# Patient Record
Sex: FEMALE | Race: WHITE | Employment: UNEMPLOYED | URBAN - METROPOLITAN AREA
[De-identification: names, ages, dates, MRNs, and addresses within clinical notes are randomized per-mention and may not be internally consistent; named-entity substitution may affect disease eponyms.]

---

## 2017-01-26 ENCOUNTER — OFFICE VISIT (OUTPATIENT)
Dept: INTERNAL MEDICINE CLINIC | Facility: CLINIC | Age: 32
End: 2017-01-26

## 2017-01-26 VITALS
BODY MASS INDEX: 28 KG/M2 | DIASTOLIC BLOOD PRESSURE: 60 MMHG | RESPIRATION RATE: 16 BRPM | SYSTOLIC BLOOD PRESSURE: 120 MMHG | HEART RATE: 100 BPM | WEIGHT: 169 LBS | TEMPERATURE: 99 F

## 2017-01-26 DIAGNOSIS — R53.83 FATIGUE, UNSPECIFIED TYPE: Primary | ICD-10-CM

## 2017-01-26 DIAGNOSIS — R20.2 BILATERAL NUMBNESS AND TINGLING OF ARMS AND LEGS: ICD-10-CM

## 2017-01-26 DIAGNOSIS — R20.0 BILATERAL NUMBNESS AND TINGLING OF ARMS AND LEGS: ICD-10-CM

## 2017-01-26 PROCEDURE — 99214 OFFICE O/P EST MOD 30 MIN: CPT | Performed by: PHYSICIAN ASSISTANT

## 2017-01-26 NOTE — PROGRESS NOTES
Paramjit Willett is a 27year old female  Patient presents with:  Numbness: arms and legs. worse in arms.        HPI:   Pt here today with concerns regarding numbness/tingling in legs/arms  - states that it has been going on for a few months now; not vadim 16  Wt 169 lb  LMP 12/15/2016  GENERAL: well developed, well nourished,in no apparent distress  SKIN: no rashes,no suspicious lesions  HEENT: atraumatic, normocephalic  LUNGS: clear to auscultation   CARDIO: RRR without murmur,   GI: good BS's,no masses, o

## 2017-01-27 ENCOUNTER — LAB ENCOUNTER (OUTPATIENT)
Dept: LAB | Age: 32
End: 2017-01-27
Attending: PHYSICIAN ASSISTANT
Payer: COMMERCIAL

## 2017-01-27 DIAGNOSIS — R53.83 FATIGUE: ICD-10-CM

## 2017-01-27 DIAGNOSIS — R20.2 PARESTHESIA: Primary | ICD-10-CM

## 2017-01-27 LAB
25-HYDROXYVITAMIN D (TOTAL): 11.2 NG/ML (ref 30–100)
BASOPHILS # BLD AUTO: 0.05 X10(3) UL (ref 0–0.1)
BASOPHILS NFR BLD AUTO: 0.9 %
EOSINOPHIL # BLD AUTO: 0.07 X10(3) UL (ref 0–0.3)
EOSINOPHIL NFR BLD AUTO: 1.2 %
ERYTHROCYTE [DISTWIDTH] IN BLOOD BY AUTOMATED COUNT: 11.7 % (ref 11.5–16)
FOLATE (FOLIC ACID), SERUM: 13.5 NG/ML (ref 8.7–24)
HAV AB SERPL IA-ACNC: 616 PG/ML (ref 193–986)
HCT VFR BLD AUTO: 40.4 % (ref 34–50)
HGB BLD-MCNC: 13.6 G/DL (ref 12–16)
IMMATURE GRANULOCYTE COUNT: 0.01 X10(3) UL (ref 0–1)
IMMATURE GRANULOCYTE RATIO %: 0.2 %
LYMPHOCYTES # BLD AUTO: 1.39 X10(3) UL (ref 0.9–4)
LYMPHOCYTES NFR BLD AUTO: 23.7 %
MCH RBC QN AUTO: 30.3 PG (ref 27–33.2)
MCHC RBC AUTO-ENTMCNC: 33.7 G/DL (ref 31–37)
MCV RBC AUTO: 90 FL (ref 81–100)
MONOCYTES # BLD AUTO: 0.45 X10(3) UL (ref 0.1–0.6)
MONOCYTES NFR BLD AUTO: 7.7 %
NEUTROPHIL ABS PRELIM: 3.89 X10 (3) UL (ref 1.3–6.7)
NEUTROPHILS # BLD AUTO: 3.89 X10(3) UL (ref 1.3–6.7)
NEUTROPHILS NFR BLD AUTO: 66.3 %
PLATELET # BLD AUTO: 299 10(3)UL (ref 150–450)
RBC # BLD AUTO: 4.49 X10(6)UL (ref 3.8–5.1)
RED CELL DISTRIBUTION WIDTH-SD: 38 FL (ref 35.1–46.3)
TSI SER-ACNC: 2.36 MIU/ML (ref 0.35–5.5)
WBC # BLD AUTO: 5.9 X10(3) UL (ref 4–13)

## 2017-01-27 PROCEDURE — 82306 VITAMIN D 25 HYDROXY: CPT

## 2017-01-27 PROCEDURE — 82607 VITAMIN B-12: CPT

## 2017-01-27 PROCEDURE — 85025 COMPLETE CBC W/AUTO DIFF WBC: CPT

## 2017-01-27 PROCEDURE — 82746 ASSAY OF FOLIC ACID SERUM: CPT

## 2017-01-27 PROCEDURE — 84443 ASSAY THYROID STIM HORMONE: CPT

## 2017-01-30 ENCOUNTER — TELEPHONE (OUTPATIENT)
Dept: INTERNAL MEDICINE CLINIC | Facility: CLINIC | Age: 32
End: 2017-01-30

## 2017-01-30 DIAGNOSIS — E55.9 VITAMIN D DEFICIENCY: Primary | ICD-10-CM

## 2017-01-30 RX ORDER — ERGOCALCIFEROL 1.25 MG/1
50000 CAPSULE ORAL WEEKLY
Qty: 12 CAPSULE | Refills: 0 | Status: SHIPPED | OUTPATIENT
Start: 2017-01-30 | End: 2017-04-18

## 2017-01-30 NOTE — TELEPHONE ENCOUNTER
----- Message from [place], [address] Sung sent at 1/30/2017  9:12 AM CST -----  Labs show low vitamin D; please have pt start on 50,000 IU vitamin D weekly x12 weeks and then recheck vitamin D level  - all other labs are normal. Please have her continue with pl

## 2017-02-07 ENCOUNTER — TELEPHONE (OUTPATIENT)
Dept: NEUROLOGY | Facility: CLINIC | Age: 32
End: 2017-02-07

## 2017-02-20 ENCOUNTER — OFFICE VISIT (OUTPATIENT)
Dept: NEUROLOGY | Facility: CLINIC | Age: 32
End: 2017-02-20

## 2017-02-20 VITALS
BODY MASS INDEX: 27.32 KG/M2 | SYSTOLIC BLOOD PRESSURE: 114 MMHG | DIASTOLIC BLOOD PRESSURE: 76 MMHG | WEIGHT: 170 LBS | HEART RATE: 84 BPM | RESPIRATION RATE: 18 BRPM | HEIGHT: 66 IN

## 2017-02-20 DIAGNOSIS — R20.9 DISTURBANCE OF SKIN SENSATION: Primary | ICD-10-CM

## 2017-02-20 PROCEDURE — 99244 OFF/OP CNSLTJ NEW/EST MOD 40: CPT | Performed by: OTHER

## 2017-02-20 NOTE — PATIENT INSTRUCTIONS
Refill policies:    • Allow 2 business days for refills; controlled substances may take longer.   • Contact your pharmacy at least 5 days prior to running out of medication and have them send an electronic request or submit request through the “request re your physician has recommended that you have a procedure or additional testing performed. [facility] will contact your insurance carrier to obtain pre-certification or prior authorization.     Unfortunately, LISA has seen an increas

## 2017-02-21 ENCOUNTER — TELEPHONE (OUTPATIENT)
Dept: NEUROLOGY | Facility: CLINIC | Age: 32
End: 2017-02-21

## 2017-02-21 NOTE — TELEPHONE ENCOUNTER
Started PA [employer]. Unable to complete  Contacted insur, spoke to rep May. Advised no pre Lamin Maw is required for MRI brain (cpt code 60816) and MRI spine cervical (cpt code 68324).   HMO policy per ID# [id number] termed 00/32/05  POS policy per I

## 2017-11-05 ENCOUNTER — OFFICE VISIT (OUTPATIENT)
Dept: FAMILY MEDICINE CLINIC | Facility: CLINIC | Age: 32
End: 2017-11-05

## 2017-11-05 VITALS
OXYGEN SATURATION: 98 % | HEIGHT: 65 IN | HEART RATE: 85 BPM | DIASTOLIC BLOOD PRESSURE: 72 MMHG | SYSTOLIC BLOOD PRESSURE: 118 MMHG | BODY MASS INDEX: 25.83 KG/M2 | WEIGHT: 155 LBS | TEMPERATURE: 99 F

## 2017-11-05 DIAGNOSIS — L30.9 ECZEMA OF BOTH HANDS: ICD-10-CM

## 2017-11-05 DIAGNOSIS — J02.9 SORE THROAT: Primary | ICD-10-CM

## 2017-11-05 DIAGNOSIS — L30.9 DERMATITIS: ICD-10-CM

## 2017-11-05 DIAGNOSIS — K13.70 LESION OF MOUTH: ICD-10-CM

## 2017-11-05 PROCEDURE — 87880 STREP A ASSAY W/OPTIC: CPT | Performed by: PHYSICIAN ASSISTANT

## 2017-11-05 PROCEDURE — 99213 OFFICE O/P EST LOW 20 MIN: CPT | Performed by: PHYSICIAN ASSISTANT

## 2017-11-05 RX ORDER — FLUOCINONIDE 0.5 MG/G
OINTMENT TOPICAL
Qty: 30 G | Refills: 0 | Status: SHIPPED | OUTPATIENT
Start: 2017-11-05 | End: 2018-04-25

## 2017-11-05 RX ORDER — MUPIROCIN CALCIUM 20 MG/G
CREAM TOPICAL
Qty: 15 G | Refills: 0 | Status: SHIPPED | OUTPATIENT
Start: 2017-11-05 | End: 2018-04-25

## 2017-11-05 RX ORDER — VALACYCLOVIR HYDROCHLORIDE 1 G/1
2 TABLET, FILM COATED ORAL EVERY 12 HOURS SCHEDULED
Qty: 4 TABLET | Refills: 0 | Status: SHIPPED | OUTPATIENT
Start: 2017-11-05 | End: 2017-11-06

## 2017-11-05 NOTE — PROGRESS NOTES
CHIEF COMPLAINT:   Patient presents with:  Sore Throat: x 5 dys   Rash: pt states she started with a small bump near mouth and now has more on left side of  face near mouth and  has some in mouth, also has small red spots on hands x 3 dys       HPI:   Meggan Torres COLONOSCOPY  2/19/2014: LAPAROSCOPIC CHOLECYSTECTOMY      Comment: Procedure: LAPAROSCOPIC CHOLECYSTECTOMY;                 Surgeon: Fabrizio Schwab MD;  Location: [facility]                MAIN OR  No date: OTHER SURGICAL HISTORY      Comment: eye surgery as a ASSAY W/OPTIC   Collection Time: 11/05/17 10:15 AM   Result Value Ref Range   STREP GRP A CUL-SCR neg Negative   Control Line Present with a clear background (yes/no) yes Yes/No   Kit Lot # [id number] Numeric   Kit Expiration Date 11/31/2018 Date         A for course of illness explained  Risks, benefits, and side effects of medication explained and discussed. The patient indicates understanding of these issues and agrees to the plan. The patient is asked to return if sx's persist or worsen.   Patient Instr

## 2017-11-05 NOTE — PATIENT INSTRUCTIONS
Sore on lip- again, this may very well be associated with a mild form of hand, foot, and mouth but since this lesion just popped up now and is on your lip, we will cover with an antiviral medication for a cold sore.   Take valacyclovir- take 2 tablets at th

## 2017-11-09 PROCEDURE — 87591 N.GONORRHOEAE DNA AMP PROB: CPT | Performed by: OBSTETRICS & GYNECOLOGY

## 2017-11-09 PROCEDURE — 88175 CYTOPATH C/V AUTO FLUID REDO: CPT | Performed by: OBSTETRICS & GYNECOLOGY

## 2017-11-09 PROCEDURE — 87491 CHLMYD TRACH DNA AMP PROBE: CPT | Performed by: OBSTETRICS & GYNECOLOGY

## 2017-11-09 PROCEDURE — 87624 HPV HI-RISK TYP POOLED RSLT: CPT | Performed by: OBSTETRICS & GYNECOLOGY

## 2017-11-28 ENCOUNTER — TELEPHONE (OUTPATIENT)
Dept: OBGYN CLINIC | Facility: CLINIC | Age: 32
End: 2017-11-28

## 2017-11-28 NOTE — TELEPHONE ENCOUNTER
Pt has requested records to be sent to Dr Anna Ward; pap,colpo, IUD insertion/removal from any years of treatment plus [address], [place] [address], [place] [address]  Sent to scan November 28,2017

## 2018-01-29 ENCOUNTER — TELEPHONE (OUTPATIENT)
Dept: INTERNAL MEDICINE CLINIC | Facility: CLINIC | Age: 33
End: 2018-01-29

## 2018-01-29 NOTE — TELEPHONE ENCOUNTER
Patient scheduled an appt via [employer] for today at 1:20 with Dr Jeff Up. She said she \"felt tightness in chest after eating & diarrhea \". Is this appt appropriate?

## 2018-01-29 NOTE — TELEPHONE ENCOUNTER
Left 3rd msg for pt to call back. No alternate number. Routed to Dr Kelly Pacheco as Kirill Kale.  Pt has 140pm appt today with Dr Kelly Pacheco for \"chest tightness when eating & diarrhea\"

## 2018-01-29 NOTE — TELEPHONE ENCOUNTER
Pt needs to be triaged. LM for pt to call back as would like to discuss symptoms now rather than wait until her appt.

## 2018-01-29 NOTE — TELEPHONE ENCOUNTER
Left 2nd msg for pt to call back as soon as she is able to discuss her symptoms of \"chest tightness after eating and diarrhea\".

## 2018-01-30 NOTE — TELEPHONE ENCOUNTER
Still no answer on pts only available number. Left detailed msg for pt advising that she missed her appt yesterday so doctor wanted to be sure pt knows that if her symptoms of chest tightness continues she should go to Urgent Care.  Pt asked to call back to

## 2018-01-30 NOTE — TELEPHONE ENCOUNTER
Pt. called and stated her sitter didn't show up yesterday, so she could not come to the appt. Reiterated Dr. Livier Nichols instructions to go to Urgent Care should symptoms recur.

## 2018-01-30 NOTE — TELEPHONE ENCOUNTER
Pt no showed for appointment today. Please let her know that if she continues to have symptoms of chest tightness she should go to the urgent care for evaluation.

## 2018-02-17 ENCOUNTER — HOSPITAL ENCOUNTER (EMERGENCY)
Facility: HOSPITAL | Age: 33
Discharge: HOME | End: 2018-02-17
Attending: EMERGENCY MEDICINE
Payer: COMMERCIAL

## 2018-02-17 ENCOUNTER — APPOINTMENT (OUTPATIENT)
Dept: CT IMAGING | Facility: HOSPITAL | Age: 33
End: 2018-02-17
Attending: EMERGENCY MEDICINE
Payer: COMMERCIAL

## 2018-02-17 ENCOUNTER — APPOINTMENT (OUTPATIENT)
Dept: GENERAL RADIOLOGY | Facility: HOSPITAL | Age: 33
End: 2018-02-17
Attending: EMERGENCY MEDICINE
Payer: COMMERCIAL

## 2018-02-17 VITALS
HEART RATE: 76 BPM | TEMPERATURE: 98 F | RESPIRATION RATE: 15 BRPM | BODY MASS INDEX: 25.71 KG/M2 | SYSTOLIC BLOOD PRESSURE: 120 MMHG | DIASTOLIC BLOOD PRESSURE: 84 MMHG | OXYGEN SATURATION: 100 % | HEIGHT: 66 IN | WEIGHT: 160 LBS

## 2018-02-17 DIAGNOSIS — R00.2 PALPITATIONS: ICD-10-CM

## 2018-02-17 DIAGNOSIS — R07.9 ACUTE CHEST PAIN: Primary | ICD-10-CM

## 2018-02-17 LAB
ALBUMIN SERPL-MCNC: 3.4 G/DL (ref 3.5–4.8)
ALP LIVER SERPL-CCNC: 75 U/L (ref 37–98)
ALT SERPL-CCNC: 14 U/L (ref 14–54)
AST SERPL-CCNC: 15 U/L (ref 15–41)
BASOPHILS # BLD AUTO: 0.04 X10(3) UL (ref 0–0.1)
BASOPHILS NFR BLD AUTO: 0.7 %
BILIRUB SERPL-MCNC: 0.3 MG/DL (ref 0.1–2)
BUN BLD-MCNC: 14 MG/DL (ref 8–20)
CALCIUM BLD-MCNC: 8.6 MG/DL (ref 8.3–10.3)
CHLORIDE: 105 MMOL/L (ref 101–111)
CO2: 29 MMOL/L (ref 22–32)
CREAT BLD-MCNC: 0.73 MG/DL (ref 0.55–1.02)
D-DIMER: 0.52 UG/ML FEU (ref 0–0.49)
EOSINOPHIL # BLD AUTO: 0.06 X10(3) UL (ref 0–0.3)
EOSINOPHIL NFR BLD AUTO: 1 %
ERYTHROCYTE [DISTWIDTH] IN BLOOD BY AUTOMATED COUNT: 11.9 % (ref 11.5–16)
GLUCOSE BLD-MCNC: 99 MG/DL (ref 70–99)
HCT VFR BLD AUTO: 35 % (ref 34–50)
HGB BLD-MCNC: 12.2 G/DL (ref 12–16)
IMMATURE GRANULOCYTE COUNT: 0.01 X10(3) UL (ref 0–1)
IMMATURE GRANULOCYTE RATIO %: 0.2 %
LYMPHOCYTES # BLD AUTO: 1.97 X10(3) UL (ref 0.9–4)
LYMPHOCYTES NFR BLD AUTO: 34.4 %
M PROTEIN MFR SERPL ELPH: 7.5 G/DL (ref 6.1–8.3)
MCH RBC QN AUTO: 29.9 PG (ref 27–33.2)
MCHC RBC AUTO-ENTMCNC: 34.9 G/DL (ref 31–37)
MCV RBC AUTO: 85.8 FL (ref 81–100)
MONOCYTES # BLD AUTO: 0.4 X10(3) UL (ref 0.1–1)
MONOCYTES NFR BLD AUTO: 7 %
NEUTROPHIL ABS PRELIM: 3.24 X10 (3) UL (ref 1.3–6.7)
NEUTROPHILS # BLD AUTO: 3.24 X10(3) UL (ref 1.3–6.7)
NEUTROPHILS NFR BLD AUTO: 56.7 %
PLATELET # BLD AUTO: 248 10(3)UL (ref 150–450)
POCT URINE PREGNANCY: NEGATIVE
POTASSIUM SERPL-SCNC: 3.3 MMOL/L (ref 3.6–5.1)
RBC # BLD AUTO: 4.08 X10(6)UL (ref 3.8–5.1)
RED CELL DISTRIBUTION WIDTH-SD: 37.2 FL (ref 35.1–46.3)
SODIUM SERPL-SCNC: 139 MMOL/L (ref 136–144)
TROPONIN: <0.046 NG/ML (ref ?–0.05)
TSI SER-ACNC: 5.16 MIU/ML (ref 0.35–5.5)
WBC # BLD AUTO: 5.7 X10(3) UL (ref 4–13)

## 2018-02-17 PROCEDURE — 36415 COLL VENOUS BLD VENIPUNCTURE: CPT

## 2018-02-17 PROCEDURE — 84443 ASSAY THYROID STIM HORMONE: CPT | Performed by: EMERGENCY MEDICINE

## 2018-02-17 PROCEDURE — 71045 X-RAY EXAM CHEST 1 VIEW: CPT | Performed by: EMERGENCY MEDICINE

## 2018-02-17 PROCEDURE — 80053 COMPREHEN METABOLIC PANEL: CPT | Performed by: EMERGENCY MEDICINE

## 2018-02-17 PROCEDURE — 81025 URINE PREGNANCY TEST: CPT

## 2018-02-17 PROCEDURE — 71275 CT ANGIOGRAPHY CHEST: CPT | Performed by: EMERGENCY MEDICINE

## 2018-02-17 PROCEDURE — 93010 ELECTROCARDIOGRAM REPORT: CPT

## 2018-02-17 PROCEDURE — 99285 EMERGENCY DEPT VISIT HI MDM: CPT

## 2018-02-17 PROCEDURE — 85378 FIBRIN DEGRADE SEMIQUANT: CPT | Performed by: EMERGENCY MEDICINE

## 2018-02-17 PROCEDURE — 93005 ELECTROCARDIOGRAM TRACING: CPT

## 2018-02-17 PROCEDURE — 85025 COMPLETE CBC W/AUTO DIFF WBC: CPT | Performed by: EMERGENCY MEDICINE

## 2018-02-17 PROCEDURE — 84484 ASSAY OF TROPONIN QUANT: CPT | Performed by: EMERGENCY MEDICINE

## 2018-02-18 LAB
ATRIAL RATE: 86 BPM
P AXIS: 41 DEGREES
P-R INTERVAL: 170 MS
Q-T INTERVAL: 378 MS
QRS DURATION: 86 MS
QTC CALCULATION (BEZET): 452 MS
R AXIS: -9 DEGREES
T AXIS: 13 DEGREES
VENTRICULAR RATE: 86 BPM

## 2018-02-18 NOTE — ED INITIAL ASSESSMENT (HPI)
Pt presents to ED with complaint of \"feeling like my heart skips a beat. \" Pt reports this feeling happened twice tonight. Pt reports it has happened in the past but had never been seen.  Pt reports it has been happening more frequently which is of concern

## 2018-02-18 NOTE — ED PROVIDER NOTES
Patient Seen in: [facility] Emergency Department    History   Patient presents with:  Arrythmia/Palpitations (cardiovascular)    Stated Complaint: palpitations - has happened a couple times tonight.   Pt denies any prolonged pe*    HPI    34year-old (Room air)    Current:/84   Pulse 76   Temp 98.3 °F (36.8 °C)   Resp 15   Ht 167.6 cm (5' 6\")   Wt 72.6 kg   LMP 02/13/2018   SpO2 100%   BMI 25.82 kg/m²         Physical Exam    General:  Patient is alert and oriented x3. No acute distress.   Well- chart above. No studies have been performed  on pregnant females exclusively to validate the 95% negative predictive value  for venous thromboembolism when the D-Dimer is greater than 0.50 ug/mL (FEU). Proceed with caution from clinical presentation. follow-up with her primary care physician and orders for an outpatient Holter monitor. Findings  of the patient's tests results were discussed with the patient in detail. They were understanding of these results and their discharge instructions.   All q

## 2018-03-27 ENCOUNTER — OFFICE VISIT (OUTPATIENT)
Dept: INTERNAL MEDICINE CLINIC | Facility: CLINIC | Age: 33
End: 2018-03-27

## 2018-03-27 VITALS
SYSTOLIC BLOOD PRESSURE: 102 MMHG | HEART RATE: 71 BPM | RESPIRATION RATE: 14 BRPM | TEMPERATURE: 98 F | BODY MASS INDEX: 25.83 KG/M2 | WEIGHT: 155 LBS | OXYGEN SATURATION: 99 % | HEIGHT: 65 IN | DIASTOLIC BLOOD PRESSURE: 60 MMHG

## 2018-03-27 DIAGNOSIS — Z87.19 H/O ORAL APHTHOUS ULCERS: ICD-10-CM

## 2018-03-27 DIAGNOSIS — M54.42 ACUTE LEFT-SIDED LOW BACK PAIN WITH LEFT-SIDED SCIATICA: Primary | ICD-10-CM

## 2018-03-27 PROCEDURE — 99214 OFFICE O/P EST MOD 30 MIN: CPT | Performed by: INTERNAL MEDICINE

## 2018-03-27 RX ORDER — METHYLPREDNISOLONE 4 MG/1
TABLET ORAL
Qty: 1 KIT | Refills: 0 | Status: SHIPPED | OUTPATIENT
Start: 2018-03-27 | End: 2018-04-05 | Stop reason: ALTCHOICE

## 2018-03-27 RX ORDER — CYCLOBENZAPRINE HCL 5 MG
10 TABLET ORAL NIGHTLY
Qty: 30 TABLET | Refills: 0 | Status: SHIPPED | OUTPATIENT
Start: 2018-03-27 | End: 2018-10-29

## 2018-03-27 NOTE — PROGRESS NOTES
Aura Brothers is a 34year old female  Patient presents with:  Low Back Pain: Middle area  Leg Pain: Left Leg      HPI:   Acute left lower back pain , she could functioon and it continued to hurt but slightly improved then a few days later at night in Alcohol use: Yes           0.0 oz/week     Comment: 1 drink per week      Patient Active Problem List:     Cholelithiasis     Abdominal pain, other specified site     Transaminitis     Metabolic alkalosis     Choledocholithiasis     Disturbance of skin sen 3. Place a towel around the back of your left thigh. Pull on the ends of the towel to pull your left knee toward your chest. Feel the stretch in your buttocks. 4. Hold for 30 to 60 seconds. Lower your leg back down. 5. Repeat 2 times, or as instructed. 1. Ankle pumps: Move your foot up and down, towards your head, and then away. Repeat 10 times with each foot. 2. Heel slides: Slowly bend your knee, drawing the heel of your foot towards you. Then slide your heel/foot from you, straightening your knee.  Do 2. Prone lumbar extension: Lie face down, arms extended overhead, palms on the floor. At the same time, raise your right arm and left leg as high as comfortably possible. Hold for 10 seconds and slowly return to start.  Repeat with left arm and right leg, a

## 2018-03-27 NOTE — PATIENT INSTRUCTIONS
Hip Rotation (Flexibility)    These instructions are for the right hip. Switch sides for your left hip. 1. Lie on your back on the floor, with your knees bent and feet flat on the floor. Don’t press your neck or lower back to the floor.   2. Rest your ri · Repeat hugging both knees to your chest at the same time. Building lower back strength  Start your exercise routine with 10 to 30 minutes a day, 1 to 3 times a day. Initial exercises  Lying on your back:  1.  Ankle pumps: Move your foot up and down, tow 2. Prone lumbar extension: Lie face down, arms extended overhead, palms on the floor. At the same time, raise your right arm and left leg as high as comfortably possible. Hold for 10 seconds and slowly return to start.  Repeat with left arm and right leg, a

## 2018-04-05 ENCOUNTER — OFFICE VISIT (OUTPATIENT)
Dept: INTERNAL MEDICINE CLINIC | Facility: CLINIC | Age: 33
End: 2018-04-05

## 2018-04-05 VITALS
BODY MASS INDEX: 25.83 KG/M2 | TEMPERATURE: 98 F | DIASTOLIC BLOOD PRESSURE: 70 MMHG | SYSTOLIC BLOOD PRESSURE: 126 MMHG | WEIGHT: 155 LBS | HEIGHT: 65 IN | RESPIRATION RATE: 12 BRPM | HEART RATE: 80 BPM

## 2018-04-05 DIAGNOSIS — M54.42 ACUTE LEFT-SIDED LOW BACK PAIN WITH LEFT-SIDED SCIATICA: Primary | ICD-10-CM

## 2018-04-05 PROCEDURE — 99213 OFFICE O/P EST LOW 20 MIN: CPT | Performed by: NURSE PRACTITIONER

## 2018-04-05 RX ORDER — NAPROXEN 500 MG/1
500 TABLET ORAL 2 TIMES DAILY WITH MEALS
Qty: 14 TABLET | Refills: 0 | Status: SHIPPED | OUTPATIENT
Start: 2018-04-05 | End: 2018-04-25

## 2018-04-05 NOTE — PROGRESS NOTES
Patient presents with: Follow - Up      HPI:  Presents for follow up of visit on 3/27/18 for back pain, treated at that visit with Methylprednisolone dose pack and cyclobenzaprine which she stated she took as directed with only moderate improvement.  In of Effort normal. Breath sounds clear bilaterally. No wheezes, rhonchi or rales  MS: Low back is non-tender w/o bruising, erythema or obvious deformity. SLR on left (+) to increase pain to posterior thigh region.  Left leg internal/external rotation and all te

## 2018-04-25 ENCOUNTER — OFFICE VISIT (OUTPATIENT)
Dept: INTERNAL MEDICINE CLINIC | Facility: CLINIC | Age: 33
End: 2018-04-25

## 2018-04-25 VITALS
HEART RATE: 84 BPM | RESPIRATION RATE: 12 BRPM | TEMPERATURE: 98 F | HEIGHT: 65 IN | SYSTOLIC BLOOD PRESSURE: 110 MMHG | DIASTOLIC BLOOD PRESSURE: 70 MMHG

## 2018-04-25 DIAGNOSIS — R06.02 SHORTNESS OF BREATH: Primary | ICD-10-CM

## 2018-04-25 DIAGNOSIS — J30.9 ALLERGIC RHINITIS, UNSPECIFIED SEASONALITY, UNSPECIFIED TRIGGER: ICD-10-CM

## 2018-04-25 PROCEDURE — 99213 OFFICE O/P EST LOW 20 MIN: CPT | Performed by: INTERNAL MEDICINE

## 2018-04-25 NOTE — PROGRESS NOTES
[address] [facility]    CHIEF COMPLAINT:  Patient presents with: Allergies: SOB and ears are plugged. Taking Claritin and zyrtec        HISTORY OF PRESENT ILLNESS:  Complains of allergies flaring the past 2 weeks. She has a post nasal drip.  Feels the sym 15 - 41 U/L   Alt 14 14 - 54 U/L   Bilirubin, Total 0.3 0.1 - 2.0 mg/dL   Total Protein 7.5 6.1 - 8.3 g/dL   Albumin 3.4 (L) 3.5 - 4.8 g/dL   Sodium 139 136 - 144 mmol/L   Potassium 3.3 (L) 3.6 - 5.1 mmol/L   Chloride 105 101 - 111 mmol/L   CO2 29.0 22.0 - Lymphocyte % 34.4 %   Monocyte % 7.0 %   Eosinophil % 1.0 %   Basophil % 0.7 %   Immature Granulocyte % 0.2 %            ASSESSMENT AND PLAN:  1. Shortness of breath  Usually when allergies are flaring. ? If wheezing. No wheezing on exam today.    Will

## 2018-05-01 ENCOUNTER — RT VISIT (OUTPATIENT)
Dept: RESPIRATORY THERAPY | Facility: HOSPITAL | Age: 33
End: 2018-05-01
Attending: INTERNAL MEDICINE
Payer: COMMERCIAL

## 2018-05-01 DIAGNOSIS — R06.02 SHORTNESS OF BREATH: ICD-10-CM

## 2018-05-01 PROCEDURE — 94729 DIFFUSING CAPACITY: CPT

## 2018-05-01 PROCEDURE — 94726 PLETHYSMOGRAPHY LUNG VOLUMES: CPT

## 2018-05-01 PROCEDURE — 94070 EVALUATION OF WHEEZING: CPT

## 2018-05-02 NOTE — PROCEDURES
Lexi Pettit is a 30-year-old  female who stands 5 feet 6 inches tall and weighs 159 pounds. She underwent standard pulmonary function testing and methacholine challenge testing on 5/1/18. She carries a diagnosis of shortness of breath.   Wilman Keyes

## 2018-10-29 ENCOUNTER — OFFICE VISIT (OUTPATIENT)
Dept: INTERNAL MEDICINE CLINIC | Facility: CLINIC | Age: 33
End: 2018-10-29
Payer: COMMERCIAL

## 2018-10-29 VITALS
SYSTOLIC BLOOD PRESSURE: 118 MMHG | HEIGHT: 66 IN | TEMPERATURE: 98 F | DIASTOLIC BLOOD PRESSURE: 72 MMHG | BODY MASS INDEX: 27 KG/M2 | HEART RATE: 88 BPM | RESPIRATION RATE: 12 BRPM

## 2018-10-29 DIAGNOSIS — S01.511A CUT OF LIP: Primary | ICD-10-CM

## 2018-10-29 PROCEDURE — 99213 OFFICE O/P EST LOW 20 MIN: CPT | Performed by: INTERNAL MEDICINE

## 2018-10-29 NOTE — PROGRESS NOTES
[address] [facility]    CHIEF COMPLAINT:  Patient presents with:  Bump: on lip since yesterday. Thinks she bit her lip but wants to r/o cold sore. Imm/Inj: already received flu shot.  documented in imm        HISTORY OF PRESENT ILLNESS:  Complains of bum 29.0 22.0 - 32.0 mmol/L   TROPONIN I   Result Value Ref Range    Troponin <0.046 <0.046 ng/mL   TSH W REFLEX TO FREE T4   Result Value Ref Range    TSH 5.160 0.350 - 5.500 mIU/mL   D-DIMER   Result Value Ref Range    D-Dimer 0.52 (H) 0.00 - 0.49 ug/mL FEU sore.   Monitor if in the future she feels any prodromal symptoms prior to a lesion coming up. If she does have prodromal symptoms she should rtc. Pt verbalized understanding. Return to clinic prn.     Lior Sung MD

## 2019-03-02 ENCOUNTER — OFFICE VISIT (OUTPATIENT)
Dept: FAMILY MEDICINE CLINIC | Facility: CLINIC | Age: 34
End: 2019-03-02
Payer: COMMERCIAL

## 2019-03-02 VITALS
HEIGHT: 65 IN | BODY MASS INDEX: 25.16 KG/M2 | OXYGEN SATURATION: 99 % | TEMPERATURE: 99 F | HEART RATE: 84 BPM | RESPIRATION RATE: 16 BRPM | WEIGHT: 151 LBS | SYSTOLIC BLOOD PRESSURE: 110 MMHG | DIASTOLIC BLOOD PRESSURE: 70 MMHG

## 2019-03-02 DIAGNOSIS — Z71.1 PHYSICALLY WELL BUT WORRIED: Primary | ICD-10-CM

## 2019-03-02 NOTE — PROGRESS NOTES
Treated at [facility] clinic twice, first visit a strep test was done it was negative, followed up a week later treated for canker sores and other lesions on the tongue- possible candidasis and treated with nystatin for 1 week.  The lesions did improve, but she

## 2019-05-16 PROBLEM — F41.9 ANXIETY: Status: ACTIVE | Noted: 2019-05-16

## 2019-05-16 NOTE — PROGRESS NOTES
Patient presents with:  Mood Disturbance: lots of anxiety and anger, when she loses control her anxiety gets worse      HPI:  Presents with approx 6-8 month history of feeling she is irritable and getting angry when she feels she shouldn't.  Stated has deal Eyes: Conjunctivae are pink and moist without exudate or drainage. Neck: Normal range of motion. Neck supple. Cardiovascular: Normal rate, regular rhythm. No murmur. Pulmonary/Chest: No respiratory distress.  Effort normal. Breath sounds clear bilat

## 2019-11-01 ENCOUNTER — TELEPHONE (OUTPATIENT)
Dept: INTERNAL MEDICINE CLINIC | Facility: CLINIC | Age: 34
End: 2019-11-01

## 2019-11-01 PROBLEM — F32.0 CURRENT MILD EPISODE OF MAJOR DEPRESSIVE DISORDER WITHOUT PRIOR EPISODE (HCC): Status: ACTIVE | Noted: 2019-11-01

## 2019-11-01 PROBLEM — J30.2 SEASONAL ALLERGIES: Status: ACTIVE | Noted: 2019-11-01

## 2019-11-01 PROBLEM — F32.0 CURRENT MILD EPISODE OF MAJOR DEPRESSIVE DISORDER WITHOUT PRIOR EPISODE: Status: ACTIVE | Noted: 2019-11-01

## 2019-11-01 NOTE — PATIENT INSTRUCTIONS
Start taking an over the counter allergy medicine and monitor headaches. Start zoloft as ordered. See Jez Velazquez outpatient for evaluation.      F/U in two weeks

## 2019-11-01 NOTE — TELEPHONE ENCOUNTER
Took call from pt, she is experiencing recurrent headaches that last all day. Denies numbness, tingling, stiff neck, blurred vision, fever, N/V. Pt is rating pain 2 out of 10.   Appt made for today with NP.

## 2019-11-01 NOTE — PROGRESS NOTES
Michael Ross is a 27year old female. CHIEF COMPLAINT   Pt complains of headache, anxiety, depression.       HPI:   Pt states she has had a headache that has been constant for about a month, but is unsure of time that is 4/10 at times and feels like index is 24.46 kg/m². GENERAL: well developed, well nourished,in no apparent distress  HEENT: atraumatic, normocephalic,ears without infection. Turbinates are slightly inflamed and throat has cobble stone appearance and some mucous present.  Sinuses are n

## 2019-11-11 ENCOUNTER — TELEPHONE (OUTPATIENT)
Dept: INTERNAL MEDICINE CLINIC | Facility: CLINIC | Age: 34
End: 2019-11-11

## 2019-11-11 NOTE — TELEPHONE ENCOUNTER
Spoke with patient, attempted to schedule apt and patient could not make that time work. Patient asked we call back to schedule in 10 minutes. Called patient back, did not answer the phone [facility] for pt. PSR will follow up with patient tomorrow.

## 2019-11-11 NOTE — TELEPHONE ENCOUNTER
Per 11/1/19 OV, pt to return in 2 weeks (around 11/15/19) for med check, headaches. .. due this Friday    PSR - Please call pt to schedule follow-up appt. Thanks!

## 2019-11-11 NOTE — TELEPHONE ENCOUNTER
Please call patient and see if she can schedule her f/u apt from last OV that is due Friday. Thanks.

## 2019-11-14 ENCOUNTER — DOCUMENTATION ONLY (OUTPATIENT)
Dept: INTERNAL MEDICINE CLINIC | Facility: CLINIC | Age: 34
End: 2019-11-14

## 2019-11-14 NOTE — PROGRESS NOTES
[address] note  On 11-14-19, the following referrals for therapy were provided to the patient:     Sid Fam, Psychologist, [facility], Therapist, [facility]   Kala Mendiola, Therapist, [facility]

## 2019-11-26 ENCOUNTER — OFFICE VISIT (OUTPATIENT)
Dept: INTERNAL MEDICINE CLINIC | Facility: CLINIC | Age: 34
End: 2019-11-26
Payer: COMMERCIAL

## 2019-11-26 VITALS
WEIGHT: 147 LBS | RESPIRATION RATE: 12 BRPM | HEART RATE: 72 BPM | TEMPERATURE: 98 F | HEIGHT: 65 IN | BODY MASS INDEX: 24.49 KG/M2 | SYSTOLIC BLOOD PRESSURE: 110 MMHG | DIASTOLIC BLOOD PRESSURE: 70 MMHG

## 2019-11-26 DIAGNOSIS — Z00.00 PHYSICAL EXAM, ANNUAL: ICD-10-CM

## 2019-11-26 DIAGNOSIS — R10.9 ABDOMINAL DISCOMFORT: ICD-10-CM

## 2019-11-26 DIAGNOSIS — F41.9 ANXIETY AND DEPRESSION: ICD-10-CM

## 2019-11-26 DIAGNOSIS — R51.9 HEADACHE, UNSPECIFIED HEADACHE TYPE: Primary | ICD-10-CM

## 2019-11-26 DIAGNOSIS — F32.A ANXIETY AND DEPRESSION: ICD-10-CM

## 2019-11-26 PROCEDURE — 99214 OFFICE O/P EST MOD 30 MIN: CPT | Performed by: INTERNAL MEDICINE

## 2019-11-26 NOTE — PROGRESS NOTES
[address] [facility]    CHIEF COMPLAINT:  Patient presents with: Follow - Up: 11/9/17-pap. Did not start Sertraline. Pt states headaches are less frequent. approx 2-3 per week. Has minor headache at this time.  2 on pain scale        HISTORY OF PRESENT ILL Wt 147 lb (66.7 kg)   BMI 24.46 kg/m²    GENERAL: well developed, well nourished,in no apparent distress  HEENT: Oropharynx normal. No tonsillar enlargement or exudates.    EYES:PERRLA, EOMI, normal optic disk,conjunctiva are clear  NECK: supple,no adenopa

## 2019-12-23 ENCOUNTER — HOSPITAL ENCOUNTER (EMERGENCY)
Facility: HOSPITAL | Age: 34
Discharge: HOME | End: 2019-12-23
Attending: EMERGENCY MEDICINE
Payer: COMMERCIAL

## 2019-12-23 VITALS
HEIGHT: 66 IN | RESPIRATION RATE: 18 BRPM | BODY MASS INDEX: 23.14 KG/M2 | WEIGHT: 144 LBS | OXYGEN SATURATION: 100 % | HEART RATE: 91 BPM | SYSTOLIC BLOOD PRESSURE: 122 MMHG | TEMPERATURE: 98 F | DIASTOLIC BLOOD PRESSURE: 88 MMHG

## 2019-12-23 DIAGNOSIS — J06.9 VIRAL URI WITH COUGH: Primary | ICD-10-CM

## 2019-12-23 DIAGNOSIS — J15.7 PNEUMONIA DUE TO MYCOPLASMA PNEUMONIAE, UNSPECIFIED LATERALITY, UNSPECIFIED PART OF LUNG: ICD-10-CM

## 2019-12-23 PROCEDURE — 99283 EMERGENCY DEPT VISIT LOW MDM: CPT

## 2019-12-23 RX ORDER — AZITHROMYCIN 250 MG/1
TABLET, FILM COATED ORAL
Qty: 1 PACKAGE | Refills: 0 | Status: SHIPPED | OUTPATIENT
Start: 2019-12-23 | End: 2019-12-28

## 2019-12-24 NOTE — ED INITIAL ASSESSMENT (HPI)
Pt reports sore throat since with dry cough last week on tuesday, [facility] and [facility]. Denies fever or vomiting.

## 2019-12-24 NOTE — ED PROVIDER NOTES
Patient Seen in: [facility] Emergency Department      History   Patient presents with:  Cough/URI    Stated Complaint: cough    HPI    Susy Heller is a 39-year-old who presents for evaluation of coughing.   She states that she has had 1 week of cough w appearing adult in no acute distress. HEENT: Atraumatic, normocephalic. Pupils equally round and reactive to light. Extra ocular movements are intact and full. Tympanic membranes are clear bilaterally.   Oropharynx is clear and moist.  No erythema or ex

## 2020-01-13 ENCOUNTER — TELEPHONE (OUTPATIENT)
Dept: INTERNAL MEDICINE CLINIC | Facility: CLINIC | Age: 35
End: 2020-01-13

## 2020-01-13 NOTE — TELEPHONE ENCOUNTER
----- Message from Demetrio Esquivel MD sent at 1/10/2020  1:44 PM CST -----  Pt no showed today. Second no show for her, I think she had one in July as well. Please send her a warning letter.

## 2020-03-12 ENCOUNTER — OFFICE VISIT (OUTPATIENT)
Dept: INTERNAL MEDICINE CLINIC | Facility: CLINIC | Age: 35
End: 2020-03-12
Payer: COMMERCIAL

## 2020-03-12 VITALS
RESPIRATION RATE: 14 BRPM | DIASTOLIC BLOOD PRESSURE: 62 MMHG | BODY MASS INDEX: 23.14 KG/M2 | HEART RATE: 83 BPM | HEIGHT: 66 IN | WEIGHT: 144 LBS | TEMPERATURE: 99 F | SYSTOLIC BLOOD PRESSURE: 104 MMHG | OXYGEN SATURATION: 97 %

## 2020-03-12 DIAGNOSIS — J06.9 VIRAL UPPER RESPIRATORY TRACT INFECTION: Primary | ICD-10-CM

## 2020-03-12 PROBLEM — N30.00 ACUTE CYSTITIS WITHOUT HEMATURIA: Status: ACTIVE | Noted: 2020-01-15

## 2020-03-12 PROCEDURE — 99213 OFFICE O/P EST LOW 20 MIN: CPT | Performed by: NURSE PRACTITIONER

## 2020-03-12 NOTE — PROGRESS NOTES
Eugene Ulloa is a 27year old female. CHIEF COMPLAINT   Scratchy throat, fatigue    HPI:   The patient reports that over the weekend she was at a dinner with many other people. Some of them were sick. This dinner was in the [facility].   She r Body mass index is 23.24 kg/m².    GENERAL: well developed, well nourished,in no apparent distress  HEENT: atraumatic, normocephalic,ears and throat are clear  EYES:PERRLA, EOMI, conjunctiva are clear  NECK: supple,no adenopathy  LUNGS: clear to auscultat

## 2020-03-12 NOTE — PATIENT INSTRUCTIONS
Keep well hydrated     You can use robitussin DM or mucinex DM as directed on the bottle for cough and chest congestion. Use cepacol for sore throat and dry cough as needed.       Use benzonotate as directed     Viral Upper Respiratory Illness (Adult) · Over-the-counter cold medicines will not shorten the length of time you’re sick, but they may be helpful for the following symptoms: cough, sore throat, and nasal and sinus congestion.  If you take prescription medicines, ask your healthcare provider or p

## 2021-02-10 ENCOUNTER — TELEPHONE (OUTPATIENT)
Dept: SCHEDULING | Age: 36
End: 2021-02-10

## 2021-03-01 ENCOUNTER — LAB SERVICES (OUTPATIENT)
Dept: LAB | Age: 36
End: 2021-03-01

## 2021-03-01 ENCOUNTER — OFFICE VISIT (OUTPATIENT)
Dept: ENDOCRINOLOGY | Age: 36
End: 2021-03-01

## 2021-03-01 DIAGNOSIS — E04.1 THYROID NODULE: ICD-10-CM

## 2021-03-01 DIAGNOSIS — E05.00 GRAVES DISEASE: Primary | ICD-10-CM

## 2021-03-01 PROCEDURE — 84443 ASSAY THYROID STIM HORMONE: CPT

## 2021-03-01 PROCEDURE — 84439 ASSAY OF FREE THYROXINE: CPT

## 2021-03-01 PROCEDURE — 36415 COLL VENOUS BLD VENIPUNCTURE: CPT

## 2021-03-01 PROCEDURE — 99204 OFFICE O/P NEW MOD 45 MIN: CPT | Performed by: INTERNAL MEDICINE

## 2021-03-01 SDOH — HEALTH STABILITY: MENTAL HEALTH: HOW OFTEN DO YOU HAVE 6 OR MORE DRINKS ON ONE OCCASION?: NEVER

## 2021-03-01 SDOH — HEALTH STABILITY: MENTAL HEALTH: HOW OFTEN DO YOU HAVE A DRINK CONTAINING ALCOHOL?: MONTHLY OR LESS

## 2021-03-01 SDOH — HEALTH STABILITY: MENTAL HEALTH: HOW MANY STANDARD DRINKS CONTAINING ALCOHOL DO YOU HAVE ON A TYPICAL DAY?: 3 OR 4

## 2021-03-01 ASSESSMENT — PATIENT HEALTH QUESTIONNAIRE - PHQ9
CLINICAL INTERPRETATION OF PHQ2 SCORE: NO FURTHER SCREENING NEEDED
SUM OF ALL RESPONSES TO PHQ9 QUESTIONS 1 AND 2: 0
2. FEELING DOWN, DEPRESSED OR HOPELESS: NOT AT ALL
CLINICAL INTERPRETATION OF PHQ9 SCORE: NO FURTHER SCREENING NEEDED
SUM OF ALL RESPONSES TO PHQ9 QUESTIONS 1 AND 2: 0
1. LITTLE INTEREST OR PLEASURE IN DOING THINGS: NOT AT ALL

## 2021-03-01 ASSESSMENT — PAIN SCALES - GENERAL: PAINLEVEL: 0

## 2021-03-02 LAB
T4 FREE SERPL-MCNC: 0.9 NG/DL (ref 0.8–1.5)
TSH SERPL-ACNC: 1.69 MCUNITS/ML (ref 0.35–5)

## 2021-03-04 ENCOUNTER — E-ADVICE (OUTPATIENT)
Dept: ENDOCRINOLOGY | Age: 36
End: 2021-03-04

## 2021-03-04 ENCOUNTER — TELEPHONE (OUTPATIENT)
Dept: SCHEDULING | Age: 36
End: 2021-03-04

## 2021-03-04 ENCOUNTER — HOSPITAL ENCOUNTER (OUTPATIENT)
Dept: ULTRASOUND IMAGING | Age: 36
Discharge: HOME | End: 2021-03-04
Attending: INTERNAL MEDICINE
Payer: COMMERCIAL

## 2021-03-04 ENCOUNTER — OFFICE VISIT (OUTPATIENT)
Dept: INTERNAL MEDICINE CLINIC | Facility: CLINIC | Age: 36
End: 2021-03-04
Payer: COMMERCIAL

## 2021-03-04 VITALS
RESPIRATION RATE: 16 BRPM | DIASTOLIC BLOOD PRESSURE: 76 MMHG | TEMPERATURE: 97 F | HEIGHT: 66 IN | HEART RATE: 91 BPM | OXYGEN SATURATION: 99 % | SYSTOLIC BLOOD PRESSURE: 126 MMHG | BODY MASS INDEX: 22.98 KG/M2 | WEIGHT: 143 LBS

## 2021-03-04 DIAGNOSIS — R30.0 DYSURIA: Primary | ICD-10-CM

## 2021-03-04 DIAGNOSIS — E04.1 THYROID NODULE: Primary | ICD-10-CM

## 2021-03-04 DIAGNOSIS — E04.1 THYROID NODULE: ICD-10-CM

## 2021-03-04 LAB
BILIRUB UR QL STRIP.AUTO: NEGATIVE
BILIRUBIN: NEGATIVE
CLARITY UR REFRACT.AUTO: CLEAR
GLUCOSE (URINE DIPSTICK): 100 MG/DL
GLUCOSE UR STRIP.AUTO-MCNC: NEGATIVE MG/DL
KETONES (URINE DIPSTICK): 15 MG/DL
LEUKOCYTE ESTERASE UR QL STRIP.AUTO: NEGATIVE
LEUKOCYTES: NEGATIVE
MULTISTIX LOT#: 2077 NUMERIC
NITRITE UR QL STRIP.AUTO: POSITIVE
NITRITE, URINE: POSITIVE
OCCULT BLOOD: NEGATIVE
PH UR STRIP.AUTO: 6 [PH] (ref 5–8)
PH, URINE: 6 (ref 4.5–8)
PROT UR STRIP.AUTO-MCNC: NEGATIVE MG/DL
RBC UR QL AUTO: NEGATIVE
SP GR UR STRIP.AUTO: 1.02 (ref 1–1.03)
SPECIFIC GRAVITY: 1.03 (ref 1–1.03)
UROBILINOGEN UR STRIP.AUTO-MCNC: 4 MG/DL
UROBILINOGEN,SEMI-QN: 1 MG/DL (ref 0–1.9)

## 2021-03-04 PROCEDURE — 3074F SYST BP LT 130 MM HG: CPT | Performed by: NURSE PRACTITIONER

## 2021-03-04 PROCEDURE — 81001 URINALYSIS AUTO W/SCOPE: CPT | Performed by: NURSE PRACTITIONER

## 2021-03-04 PROCEDURE — 87086 URINE CULTURE/COLONY COUNT: CPT | Performed by: NURSE PRACTITIONER

## 2021-03-04 PROCEDURE — 76536 US EXAM OF HEAD AND NECK: CPT | Performed by: INTERNAL MEDICINE

## 2021-03-04 PROCEDURE — 81003 URINALYSIS AUTO W/O SCOPE: CPT | Performed by: NURSE PRACTITIONER

## 2021-03-04 PROCEDURE — 99213 OFFICE O/P EST LOW 20 MIN: CPT | Performed by: NURSE PRACTITIONER

## 2021-03-04 PROCEDURE — 3008F BODY MASS INDEX DOCD: CPT | Performed by: NURSE PRACTITIONER

## 2021-03-04 PROCEDURE — 3078F DIAST BP <80 MM HG: CPT | Performed by: NURSE PRACTITIONER

## 2021-03-04 RX ORDER — PHENAZOPYRIDINE HYDROCHLORIDE 100 MG/1
1 TABLET, FILM COATED ORAL DAILY
COMMUNITY
Start: 2021-03-02 | End: 2021-03-04

## 2021-03-04 RX ORDER — CIPROFLOXACIN 500 MG/1
500 TABLET, FILM COATED ORAL 2 TIMES DAILY
Qty: 10 TABLET | Refills: 0 | Status: SHIPPED | OUTPATIENT
Start: 2021-03-04 | End: 2021-03-09

## 2021-03-04 RX ORDER — PHENAZOPYRIDINE HYDROCHLORIDE 100 MG/1
100 TABLET, FILM COATED ORAL 3 TIMES DAILY PRN
Qty: 9 TABLET | Refills: 0 | Status: SHIPPED | OUTPATIENT
Start: 2021-03-04 | End: 2021-03-06

## 2021-03-04 RX ORDER — SULFAMETHOXAZOLE AND TRIMETHOPRIM 800; 160 MG/1; MG/1
1 TABLET ORAL DAILY
COMMUNITY
Start: 2021-03-02 | End: 2021-03-04 | Stop reason: ALTCHOICE

## 2021-03-04 NOTE — PATIENT INSTRUCTIONS
Take antibiotic completely as ordered     Take antibiotic with food    Eat yogurt twice a day while on antibiotic or take an oral probiotic    Monitor for diarrhea    Follow up as needed if your symptoms do not resolve    Bladder Infection, Female (Adult) The most common cause of bladder infections is bacteria from the bowels. The bacteria get onto the skin around the opening of the urethra. From there, they can get into the urine and travel up to the bladder, causing inflammation and infection.  This usuall · Urinate more often. Don't try to hold urine in for a long time. · Wear loose-fitting clothes and cotton underwear. Avoid tight-fitting pants. · Improve your diet and prevent constipation.  Eat more fresh fruit and vegetables, and fiber, and less junk an

## 2021-03-04 NOTE — PROGRESS NOTES
Cookie Moore is a 38year old female. CHIEF COMPLAINT   UTI symptoms    HPI:   Has had UTI symptoms for four days. Was prescribed bactrim with some relief but symptoms are still ongoing.  Has burning with urination, frequency, bladder pain and pressu 02/17/2018    MPV 11.8 (H) 01/08/2012      Lab Results   Component Value Date    GLU 99 02/17/2018    BUN 14 02/17/2018    CREATSERUM 0.73 02/17/2018    GFRNAA 109 02/17/2018    GFRAA 125 02/17/2018    CA 8.6 02/17/2018    ALKPHO 75 02/17/2018    AST 15 02

## 2021-03-05 ENCOUNTER — TELEPHONE (OUTPATIENT)
Dept: SCHEDULING | Age: 36
End: 2021-03-05

## 2021-03-05 ENCOUNTER — PATIENT MESSAGE (OUTPATIENT)
Dept: INTERNAL MEDICINE CLINIC | Facility: CLINIC | Age: 36
End: 2021-03-05

## 2021-03-05 NOTE — TELEPHONE ENCOUNTER
From: Hoang Garrison  To: DAVID Tovar  Sent: 3/5/2021 7:19 AM CST  Subject: Test Results Question    Good morning, so what does this mean? Do these results indicate what kind of bacteria jams the better course of treatment?     Thanks   [employer]

## 2021-03-05 NOTE — TELEPHONE ENCOUNTER
These let the patient know that we are still waiting for the urine culture which will let us know if there is any growth of bacteria then after that there is a sensitivity report that will let us know what antibiotic is going to take care of it.   Once we h

## 2021-03-05 NOTE — TELEPHONE ENCOUNTER
Pt called to make sure her message got through. Pt was really hoping to get this done today. Pt also stated that if we needed anything else to please call her.    Thank you

## 2021-03-06 ENCOUNTER — PATIENT MESSAGE (OUTPATIENT)
Dept: INTERNAL MEDICINE CLINIC | Facility: CLINIC | Age: 36
End: 2021-03-06

## 2021-03-06 DIAGNOSIS — R82.90 ABNORMAL URINALYSIS: Primary | ICD-10-CM

## 2021-03-06 RX ORDER — PHENAZOPYRIDINE HYDROCHLORIDE 100 MG/1
100 TABLET, FILM COATED ORAL 3 TIMES DAILY PRN
Qty: 9 TABLET | Refills: 0 | Status: SHIPPED | OUTPATIENT
Start: 2021-03-06 | End: 2021-03-09

## 2021-03-06 NOTE — TELEPHONE ENCOUNTER
I am sending a refill on the phenazopyridine. Please let the patient know to continue the antibiotic as planned. The abnormal UA does not necessarily mean any kidney issues or DM- it could be related to the UTI.      Please order a repeat UA with reflex to

## 2021-03-06 NOTE — TELEPHONE ENCOUNTER
Please see pts msgs below. Thanks. From: Eugene Ulloa  To: DAVID Avelar  Sent: 3/6/2021  7:57 AM CST  Subject: Test Results Question    1) In both urine analysis I saw trace for protein, keotones and nitrites.  Is this something I need to

## 2021-03-08 DIAGNOSIS — Z01.812 PRE-PROCEDURAL LABORATORY EXAMINATION: Primary | ICD-10-CM

## 2021-03-10 ENCOUNTER — HOSPITAL ENCOUNTER (OUTPATIENT)
Dept: LAB | Age: 36
Discharge: HOME | End: 2021-03-10
Attending: RADIOLOGY

## 2021-03-10 DIAGNOSIS — Z01.812 PRE-PROCEDURAL LABORATORY EXAMINATION: ICD-10-CM

## 2021-03-10 LAB
SARS-COV-2 RNA RESP QL NAA+PROBE: NOT DETECTED
SERVICE CMNT-IMP: NORMAL
SERVICE CMNT-IMP: NORMAL

## 2021-03-10 PROCEDURE — U0003 INFECTIOUS AGENT DETECTION BY NUCLEIC ACID (DNA OR RNA); SEVERE ACUTE RESPIRATORY SYNDROME CORONAVIRUS 2 (SARS-COV-2) (CORONAVIRUS DISEASE [COVID-19]), AMPLIFIED PROBE TECHNIQUE, MAKING USE OF HIGH THROUGHPUT TECHNOLOGIES AS DESCRIBED BY CMS-2020-01-R: HCPCS | Performed by: RADIOLOGY

## 2021-03-11 ENCOUNTER — TELEPHONE (OUTPATIENT)
Dept: PREADMISSION TESTING | Age: 36
End: 2021-03-11

## 2021-03-11 VITALS — WEIGHT: 140 LBS | BODY MASS INDEX: 22.5 KG/M2 | HEIGHT: 66 IN

## 2021-03-11 SDOH — HEALTH STABILITY: MENTAL HEALTH: HOW OFTEN DO YOU HAVE 6 OR MORE DRINKS ON ONE OCCASION?: NEVER

## 2021-03-11 SDOH — HEALTH STABILITY: MENTAL HEALTH: HOW OFTEN DO YOU HAVE A DRINK CONTAINING ALCOHOL?: MONTHLY OR LESS

## 2021-03-11 SDOH — HEALTH STABILITY: MENTAL HEALTH: HOW MANY STANDARD DRINKS CONTAINING ALCOHOL DO YOU HAVE ON A TYPICAL DAY?: 3 OR 4

## 2021-03-11 ASSESSMENT — ACTIVITIES OF DAILY LIVING (ADL)
ADL_SCORE: 12
SENSORY_SUPPORT_DEVICES: EYEGLASSES;CONTACTS
ADL_BEFORE_ADMISSION: INDEPENDENT

## 2021-03-12 ENCOUNTER — HOSPITAL ENCOUNTER (OUTPATIENT)
Dept: INTERVENTIONAL RADIOLOGY/VASCULAR | Age: 36
Discharge: HOME | End: 2021-03-12
Attending: INTERNAL MEDICINE

## 2021-03-12 ENCOUNTER — E-ADVICE (OUTPATIENT)
Dept: ENDOCRINOLOGY | Age: 36
End: 2021-03-12

## 2021-03-12 DIAGNOSIS — E04.1 THYROID NODULE: ICD-10-CM

## 2021-03-12 PROCEDURE — 10005 FNA BX W/US GDN 1ST LES: CPT

## 2021-03-12 PROCEDURE — 10002801 HB RX 250 W/O HCPCS: Performed by: RADIOLOGY

## 2021-03-12 PROCEDURE — 88112 CYTOPATH CELL ENHANCE TECH: CPT | Performed by: INTERNAL MEDICINE

## 2021-03-12 RX ORDER — LIDOCAINE HYDROCHLORIDE 10 MG/ML
INJECTION, SOLUTION INFILTRATION; PERINEURAL PRN
Status: COMPLETED | OUTPATIENT
Start: 2021-03-12 | End: 2021-03-12

## 2021-03-12 RX ADMIN — LIDOCAINE HYDROCHLORIDE 10 ML: 10 INJECTION, SOLUTION INFILTRATION; PERINEURAL at 10:54

## 2021-03-15 LAB
ASR DISCLAIMER: NORMAL
CASE RPRT: NORMAL
CLINICAL INFO: NORMAL
PATH REPORT.FINAL DX SPEC: NORMAL
PATH REPORT.GROSS SPEC: NORMAL
PATH REPORT.MICROSCOPIC SPEC OTHER STN: NORMAL

## 2021-03-16 ENCOUNTER — E-ADVICE (OUTPATIENT)
Dept: ENDOCRINOLOGY | Age: 36
End: 2021-03-16

## 2021-03-22 ENCOUNTER — APPOINTMENT (OUTPATIENT)
Dept: ULTRASOUND IMAGING | Age: 36
End: 2021-03-22
Attending: INTERNAL MEDICINE

## 2021-04-07 ENCOUNTER — TELEPHONE (OUTPATIENT)
Dept: INTERNAL MEDICINE CLINIC | Facility: CLINIC | Age: 36
End: 2021-04-07

## 2021-04-07 NOTE — TELEPHONE ENCOUNTER
Virtual/Telephone Consent    Sera Moise verbally {consents to a Virtual/Telephone Check-In service on 4/7/21. Patient understands and accepts financial responsibility for any deductible, co-insurance and/or co-pays associated with this service.

## 2021-04-09 NOTE — PROGRESS NOTES
Virtual Telephone Check-In    Alessandra Diamond verbally consents to a Virtual/Telephone Check-In visit on 04/09/21. Patient has been referred to the [facility] website at [email or web address] to review the yearly Consent to Treat document.     Patient kenny Leukocyte Esterase Urine Negative Negative    APPEARANCE Cloudy Clear    Color Urine Red Yellow    Multistix Lot# [id number] Numeric    Multistix Expiration Date 11/30/2021 Date   URINALYSIS, ROUTINE   Result Value Ref Range    Urine Color Nidia (A) Yellow    C the best interest of the provider-patient relationship, due to the ongoing public health crisis/national emergency and because of restrictions of visitation. There are limitations of this visit as no physical exam could be performed.   Every conscious effo

## 2021-05-14 ENCOUNTER — OFFICE VISIT (OUTPATIENT)
Dept: INTERNAL MEDICINE CLINIC | Facility: CLINIC | Age: 36
End: 2021-05-14
Payer: COMMERCIAL

## 2021-05-14 VITALS
DIASTOLIC BLOOD PRESSURE: 72 MMHG | HEART RATE: 76 BPM | HEIGHT: 66 IN | RESPIRATION RATE: 12 BRPM | BODY MASS INDEX: 22.98 KG/M2 | SYSTOLIC BLOOD PRESSURE: 120 MMHG | TEMPERATURE: 98 F | WEIGHT: 143 LBS

## 2021-05-14 DIAGNOSIS — F41.9 ANXIETY: ICD-10-CM

## 2021-05-14 PROCEDURE — 99213 OFFICE O/P EST LOW 20 MIN: CPT | Performed by: INTERNAL MEDICINE

## 2021-05-14 PROCEDURE — 3008F BODY MASS INDEX DOCD: CPT | Performed by: INTERNAL MEDICINE

## 2021-05-14 PROCEDURE — 3078F DIAST BP <80 MM HG: CPT | Performed by: INTERNAL MEDICINE

## 2021-05-14 PROCEDURE — 3074F SYST BP LT 130 MM HG: CPT | Performed by: INTERNAL MEDICINE

## 2021-05-14 RX ORDER — ESCITALOPRAM OXALATE 5 MG/1
5 TABLET ORAL DAILY
Qty: 90 TABLET | Refills: 0 | Status: SHIPPED | OUTPATIENT
Start: 2021-05-14 | End: 2021-09-22

## 2021-05-14 NOTE — PROGRESS NOTES
[facility]    CHIEF COMPLAINT:  Patient presents with: Follow - Up: 11/9/17-pap        HISTORY OF PRESENT ILLNESS:  Here for follow up. Anxiety: much better with lexapro. Feels about 80% improved.  Still has some times when she is anxious but i Negative    pH Urine 6.0 5.0 - 8.0    Protein Urine Negative Negative mg/dL    Urobilinogen Urine 4.0 (A) <2.0 mg/dL    Nitrite Urine Positive (A) Negative    Leukocyte Esterase Urine Negative Negative    WBC Urine 1-5 0 - 5 /HPF    RBC Urine 0-2 0 - 2 /HP

## 2021-05-26 VITALS
WEIGHT: 140 LBS | OXYGEN SATURATION: 100 % | BODY MASS INDEX: 22.5 KG/M2 | HEART RATE: 74 BPM | DIASTOLIC BLOOD PRESSURE: 72 MMHG | SYSTOLIC BLOOD PRESSURE: 115 MMHG | HEIGHT: 66 IN | RESPIRATION RATE: 18 BRPM

## 2021-06-12 DIAGNOSIS — F41.9 ANXIETY: ICD-10-CM

## 2021-06-15 RX ORDER — ESCITALOPRAM OXALATE 5 MG/1
TABLET ORAL
Qty: 30 TABLET | Refills: 1 | OUTPATIENT
Start: 2021-06-15

## 2021-06-21 ENCOUNTER — APPOINTMENT (OUTPATIENT)
Dept: ENDOCRINOLOGY | Age: 36
End: 2021-06-21

## 2021-08-31 ENCOUNTER — OFFICE VISIT (OUTPATIENT)
Dept: INTERNAL MEDICINE CLINIC | Facility: CLINIC | Age: 36
End: 2021-08-31
Payer: COMMERCIAL

## 2021-08-31 VITALS
HEART RATE: 66 BPM | RESPIRATION RATE: 14 BRPM | DIASTOLIC BLOOD PRESSURE: 76 MMHG | SYSTOLIC BLOOD PRESSURE: 112 MMHG | HEIGHT: 66 IN | BODY MASS INDEX: 23.24 KG/M2 | WEIGHT: 144.63 LBS | OXYGEN SATURATION: 98 % | TEMPERATURE: 98 F

## 2021-08-31 DIAGNOSIS — M54.16 LUMBAR RADICULOPATHY: Primary | ICD-10-CM

## 2021-08-31 PROCEDURE — 99214 OFFICE O/P EST MOD 30 MIN: CPT | Performed by: NURSE PRACTITIONER

## 2021-08-31 PROCEDURE — 3074F SYST BP LT 130 MM HG: CPT | Performed by: NURSE PRACTITIONER

## 2021-08-31 PROCEDURE — 3078F DIAST BP <80 MM HG: CPT | Performed by: NURSE PRACTITIONER

## 2021-08-31 PROCEDURE — 3008F BODY MASS INDEX DOCD: CPT | Performed by: NURSE PRACTITIONER

## 2021-08-31 RX ORDER — PREDNISONE 20 MG/1
40 TABLET ORAL DAILY
Qty: 14 TABLET | Refills: 0 | Status: SHIPPED | OUTPATIENT
Start: 2021-08-31 | End: 2021-09-07

## 2021-08-31 RX ORDER — FLUOCINONIDE 0.5 MG/G
1 OINTMENT TOPICAL AS NEEDED
COMMUNITY
Start: 2021-06-19

## 2021-08-31 NOTE — PROGRESS NOTES
Ledon Lefort is a 39year old female. CHIEF COMPLAINT   Back and leg pain    HPI:   Patients pain started two weeks ago in her back. Then got better. A week later re-injured it when lifting something. Now has back pain with pain down the left leg.  Meghan Art 12.2 02/17/2018    HCT 35.0 02/17/2018    MCV 85.8 02/17/2018    MCH 29.9 02/17/2018    MCHC 34.9 02/17/2018    RDW 11.9 02/17/2018    .0 02/17/2018    MPV 11.8 (H) 01/08/2012      Lab Results   Component Value Date    GLU 99 02/17/2018    BUN 14 02

## 2021-08-31 NOTE — PATIENT INSTRUCTIONS
Start the prednisone oral in the morning. Take it with food and do not lay down for 30 mins after taking it. Monitor effectiveness and for side effects. Do not take other NSAIDs like ibuprofen while taking it.  You can still take tylenol as needed for pain

## 2021-09-19 DIAGNOSIS — F41.9 ANXIETY: ICD-10-CM

## 2021-09-22 ENCOUNTER — TELEPHONE (OUTPATIENT)
Dept: INTERNAL MEDICINE CLINIC | Facility: CLINIC | Age: 36
End: 2021-09-22

## 2021-09-22 DIAGNOSIS — M54.16 LUMBAR RADICULOPATHY: Primary | ICD-10-CM

## 2021-09-22 RX ORDER — CYCLOBENZAPRINE HCL 5 MG
5 TABLET ORAL 3 TIMES DAILY PRN
Qty: 21 TABLET | Refills: 0 | Status: SHIPPED | OUTPATIENT
Start: 2021-09-22 | End: 2021-09-29

## 2021-09-22 RX ORDER — MELOXICAM 7.5 MG/1
7.5 TABLET ORAL DAILY
Qty: 30 TABLET | Refills: 0 | Status: SHIPPED | OUTPATIENT
Start: 2021-09-22 | End: 2021-10-26

## 2021-09-22 RX ORDER — ESCITALOPRAM OXALATE 5 MG/1
5 TABLET ORAL DAILY
Qty: 30 TABLET | Refills: 0 | Status: SHIPPED | OUTPATIENT
Start: 2021-09-22 | End: 2021-11-18

## 2021-09-22 NOTE — TELEPHONE ENCOUNTER
Pt calling and said that she was given steroids for her leg and said that she got a steroid injection for her eczema and did not know it was a steroid and her derm said to stop taking the steroid Gloria prescribed and see if she can get something else, pt m

## 2021-09-22 NOTE — TELEPHONE ENCOUNTER
The flexeril was sent to pharmacy as well as meloxicam for pain. Do not take NSAIDs while taking meloxicam.  Take the meloxicam with food. No alcohol with either medication. Monitor for side effects, allergy, effectiveness.   she may take Tylenol twice a

## 2021-09-22 NOTE — TELEPHONE ENCOUNTER
Last OV relevant to medication: 5/14/21  Last refill date: 5/14/21     #/refills: 90/0  When pt was asked to return for OV: 3 months for anxiey follow up and PE  Upcoming appt/reason: reminded  Was pt informed of any over due labs: reminded        Per 5/14

## 2021-09-22 NOTE — TELEPHONE ENCOUNTER
Spoke with pt  Stated she had OV with Gloria on 8/31/21, was given steroid therapy  Stated she went to see Derm next day for Eczema at [facility]  She was given steroid shot and was advised to not take steroid as it will be in her system for 2-3 mo

## 2021-10-21 ENCOUNTER — TELEPHONE (OUTPATIENT)
Dept: INTERNAL MEDICINE CLINIC | Facility: CLINIC | Age: 36
End: 2021-10-21

## 2021-10-21 DIAGNOSIS — F41.9 ANXIETY: ICD-10-CM

## 2021-10-21 RX ORDER — ESCITALOPRAM OXALATE 5 MG/1
TABLET ORAL
Qty: 30 TABLET | Refills: 0 | OUTPATIENT
Start: 2021-10-21

## 2021-10-22 DIAGNOSIS — Z00.00 PHYSICAL EXAM, ANNUAL: Primary | ICD-10-CM

## 2021-10-22 DIAGNOSIS — M54.16 LUMBAR RADICULOPATHY: ICD-10-CM

## 2021-10-25 NOTE — TELEPHONE ENCOUNTER
Last OV relevant to medication: 8/31/21  Last refill date: 9/22/21     #/refills: 30/0  When pt was asked to return for OV: as needed or when routine care due  Upcoming appt/reason: 10/26/21, PE+follow-up  Was pt informed of any over due labs: yes, Ramesh

## 2021-10-26 ENCOUNTER — OFFICE VISIT (OUTPATIENT)
Dept: INTERNAL MEDICINE CLINIC | Facility: CLINIC | Age: 36
End: 2021-10-26
Payer: COMMERCIAL

## 2021-10-26 VITALS
WEIGHT: 145.38 LBS | HEIGHT: 66 IN | TEMPERATURE: 97 F | HEART RATE: 64 BPM | BODY MASS INDEX: 23.36 KG/M2 | RESPIRATION RATE: 12 BRPM | SYSTOLIC BLOOD PRESSURE: 108 MMHG | DIASTOLIC BLOOD PRESSURE: 76 MMHG

## 2021-10-26 DIAGNOSIS — F41.9 ANXIETY: ICD-10-CM

## 2021-10-26 DIAGNOSIS — M54.16 LUMBAR RADICULOPATHY: ICD-10-CM

## 2021-10-26 DIAGNOSIS — Z00.00 PHYSICAL EXAM, ANNUAL: Primary | ICD-10-CM

## 2021-10-26 PROCEDURE — 3078F DIAST BP <80 MM HG: CPT | Performed by: INTERNAL MEDICINE

## 2021-10-26 PROCEDURE — 3074F SYST BP LT 130 MM HG: CPT | Performed by: INTERNAL MEDICINE

## 2021-10-26 PROCEDURE — 99395 PREV VISIT EST AGE 18-39: CPT | Performed by: INTERNAL MEDICINE

## 2021-10-26 PROCEDURE — 3008F BODY MASS INDEX DOCD: CPT | Performed by: INTERNAL MEDICINE

## 2021-10-26 RX ORDER — MELOXICAM 7.5 MG/1
TABLET ORAL
COMMUNITY

## 2021-10-26 RX ORDER — MELOXICAM 7.5 MG/1
TABLET ORAL
Qty: 30 TABLET | Refills: 0 | Status: SHIPPED | OUTPATIENT
Start: 2021-10-26 | End: 2021-12-20

## 2021-10-26 NOTE — PROGRESS NOTES
[facility]    CHIEF COMPLAINT: Patient presents with:  Routine Physical: sees gyne. 11/9/17-pap.          HPI:   Jd Velazco is a 39year old female who presents for a complete physical exam. Symptoms: denies discharge, itching, burning or surgery as a baby.  Left eye      Family History   Problem Relation Age of Onset   • Cancer Maternal Grandmother 80        lung cancer   • Other (Other) Maternal Grandfather         brain tumor   • Cancer Mother    • Other (Other) Mother         Fibroids tender,FROM of the back  EXTREMITIES: no cyanosis, clubbing or edema  NEURO: Oriented times three,cranial nerves are intact,motor and sensory are grossly intact    Labs:   Lab Results   Component Value Date/Time    WBC 5.7 02/17/2018 09:33 PM    HGB 12.2 0

## 2021-11-18 DIAGNOSIS — F41.9 ANXIETY: ICD-10-CM

## 2021-11-23 RX ORDER — ESCITALOPRAM OXALATE 5 MG/1
5 TABLET ORAL DAILY
Qty: 30 TABLET | Refills: 0 | Status: SHIPPED | OUTPATIENT
Start: 2021-11-23 | End: 2022-01-05

## 2021-11-23 NOTE — TELEPHONE ENCOUNTER
Last OV relevant to medication: 10/26/21  Last refill date: 9/22/21     #/refills: 30/0  When pt was asked to return for OV: 1 yr  Upcoming appt/reason: none  Was pt informed of any over due labs: yes

## 2021-12-20 ENCOUNTER — OFFICE VISIT (OUTPATIENT)
Dept: INTERNAL MEDICINE CLINIC | Facility: CLINIC | Age: 36
End: 2021-12-20
Payer: COMMERCIAL

## 2021-12-20 VITALS
WEIGHT: 144.5 LBS | RESPIRATION RATE: 12 BRPM | HEART RATE: 76 BPM | BODY MASS INDEX: 23.22 KG/M2 | HEIGHT: 66 IN | TEMPERATURE: 97 F | DIASTOLIC BLOOD PRESSURE: 70 MMHG | SYSTOLIC BLOOD PRESSURE: 110 MMHG

## 2021-12-20 DIAGNOSIS — M77.12 LATERAL EPICONDYLITIS OF LEFT ELBOW: ICD-10-CM

## 2021-12-20 DIAGNOSIS — G89.29 CHRONIC LEFT-SIDED LOW BACK PAIN WITH LEFT-SIDED SCIATICA: Primary | ICD-10-CM

## 2021-12-20 DIAGNOSIS — M54.42 CHRONIC LEFT-SIDED LOW BACK PAIN WITH LEFT-SIDED SCIATICA: Primary | ICD-10-CM

## 2021-12-20 PROCEDURE — 3078F DIAST BP <80 MM HG: CPT | Performed by: INTERNAL MEDICINE

## 2021-12-20 PROCEDURE — 3074F SYST BP LT 130 MM HG: CPT | Performed by: INTERNAL MEDICINE

## 2021-12-20 PROCEDURE — 99214 OFFICE O/P EST MOD 30 MIN: CPT | Performed by: INTERNAL MEDICINE

## 2021-12-20 PROCEDURE — 3008F BODY MASS INDEX DOCD: CPT | Performed by: INTERNAL MEDICINE

## 2021-12-20 RX ORDER — PREDNISONE 20 MG/1
TABLET ORAL
Qty: 10 TABLET | Refills: 0 | Status: SHIPPED | OUTPATIENT
Start: 2021-12-20 | End: 2022-01-05 | Stop reason: ALTCHOICE

## 2021-12-20 NOTE — PROGRESS NOTES
[facility]    CHIEF COMPLAINT:  Patient presents with:  Pain: continued elbow and leg pain. 11/9/17-pap        HISTORY OF PRESENT ILLNESS:  Complains of pain in left leg. She has had low back pain which radiates to the left leg since 8/2021. straight leg raise negative. EXTREMITIES: left lateral epicondyle tenderness to palpation, no redness or swelling.      DATA:  Results for orders placed or performed in visit on 03/04/21   URINALYSIS, AUTO, W/O SCOPE   Result Value Ref Range    Glucose Ur days then stop. Dispense: 10 tablet; Refill: 0    2. Lateral epicondylitis of left elbow  Given prednisone as above. See if pain resolves with that. If persists see ortho.  Will need a steroid injection.   - ORTHOPEDIC - INTERNAL        Return if symptoms

## 2021-12-29 ENCOUNTER — TELEPHONE (OUTPATIENT)
Dept: ORTHOPEDICS CLINIC | Facility: CLINIC | Age: 36
End: 2021-12-29

## 2021-12-29 NOTE — TELEPHONE ENCOUNTER
Patient scheduled an appointment with [facility]. on 12/30 for left leg pain. Patient has had no imaging. Please advise if imaging is needed prior.

## 2021-12-29 NOTE — TELEPHONE ENCOUNTER
Pt c/o lateral left leg pain from the hip down to the foot. Pt adament on the phone this is not related to her back. Pt not able to pinpoint a specific area of the leg that hurts worse.  Please advise if there would be any specific xrays needed, or if you w

## 2021-12-30 ENCOUNTER — OFFICE VISIT (OUTPATIENT)
Dept: ORTHOPEDICS CLINIC | Facility: CLINIC | Age: 36
End: 2021-12-30
Payer: COMMERCIAL

## 2021-12-30 VITALS — OXYGEN SATURATION: 100 % | WEIGHT: 138 LBS | HEART RATE: 78 BPM | BODY MASS INDEX: 22.45 KG/M2 | HEIGHT: 65.75 IN

## 2021-12-30 DIAGNOSIS — M70.62 TROCHANTERIC BURSITIS OF LEFT HIP: ICD-10-CM

## 2021-12-30 DIAGNOSIS — M76.32 ILIOTIBIAL BAND SYNDROME OF LEFT SIDE: Primary | ICD-10-CM

## 2021-12-30 PROCEDURE — 20600 DRAIN/INJ JOINT/BURSA W/O US: CPT | Performed by: PHYSICIAN ASSISTANT

## 2021-12-30 PROCEDURE — 3008F BODY MASS INDEX DOCD: CPT | Performed by: PHYSICIAN ASSISTANT

## 2021-12-30 PROCEDURE — 99205 OFFICE O/P NEW HI 60 MIN: CPT | Performed by: PHYSICIAN ASSISTANT

## 2021-12-30 RX ORDER — MELOXICAM 15 MG/1
15 TABLET ORAL DAILY
Qty: 30 TABLET | Refills: 2 | Status: SHIPPED | OUTPATIENT
Start: 2021-12-30 | End: 2022-01-29

## 2021-12-30 RX ORDER — TRIAMCINOLONE ACETONIDE 40 MG/ML
40 INJECTION, SUSPENSION INTRA-ARTICULAR; INTRAMUSCULAR ONCE
Status: COMPLETED | OUTPATIENT
Start: 2021-12-30 | End: 2021-12-30

## 2021-12-30 RX ADMIN — TRIAMCINOLONE ACETONIDE 40 MG: 40 INJECTION, SUSPENSION INTRA-ARTICULAR; INTRAMUSCULAR at 11:55:00

## 2021-12-30 NOTE — H&P
[facility] - ORTHOPEDICS   [address], [address]   [phone]     NEW PATIENT VISIT - HISTORY AND PHYSICAL EXAMINATION     Name: Pastora Jacobson   MRN: [id number]  Date: 12/30/2021     CC: Left hip pain.     REFER Dispense Refill   • Meloxicam 15 MG Oral Tab Take 1 tablet (15 mg total) by mouth daily. Take 1 tablet by mouth daily for 30 days with food. 30 tablet 2   • escitalopram 5 MG Oral Tab Take 1 tablet (5 mg total) by mouth daily. Appt/labs due.  30 tablet 0 Findings: No bruising. Neurological:      General: No focal deficit present. Mental Status: Alert.    Psychiatric:         Mood and Affect: Mood normal.     On physical examination she is alert and oriented x3, well-developed, well nourished, no acut recommend specifically a physical therapist as initial management for improvement. We discussed a broad differential today including lumbar radiculopathy, trochanteric bursitis, IT band syndrome, femoral acetabular labral tearing and meniscal tearing.   I

## 2021-12-30 NOTE — PROCEDURES
Left Trochanteric Bursa Injection     Name: Scot Juarez   MRN: [id number]  Date: 12/30/2021     Clinical Indications:   Trochanteric Bursitis    After informed consent, the injection site was marked, sterilized with topical chlorhexidine antiseptic, and

## 2022-01-01 DIAGNOSIS — F41.9 ANXIETY: ICD-10-CM

## 2022-01-04 RX ORDER — ESCITALOPRAM OXALATE 5 MG/1
TABLET ORAL
Qty: 30 TABLET | Refills: 0 | OUTPATIENT
Start: 2022-01-04

## 2022-01-05 ENCOUNTER — PATIENT MESSAGE (OUTPATIENT)
Dept: INTERNAL MEDICINE CLINIC | Facility: CLINIC | Age: 37
End: 2022-01-05

## 2022-01-05 ENCOUNTER — LAB ENCOUNTER (OUTPATIENT)
Dept: LAB | Age: 37
End: 2022-01-05
Attending: NURSE PRACTITIONER
Payer: COMMERCIAL

## 2022-01-05 DIAGNOSIS — Z00.00 PHYSICAL EXAM, ANNUAL: Primary | ICD-10-CM

## 2022-01-05 LAB
ALBUMIN SERPL-MCNC: 3.7 G/DL (ref 3.4–5)
ALBUMIN/GLOB SERPL: 1.1 {RATIO} (ref 1–2)
ALP LIVER SERPL-CCNC: 57 U/L
ALT SERPL-CCNC: 20 U/L
ANION GAP SERPL CALC-SCNC: 7 MMOL/L (ref 0–18)
AST SERPL-CCNC: 20 U/L (ref 15–37)
BASOPHILS # BLD AUTO: 0.05 X10(3) UL (ref 0–0.2)
BASOPHILS NFR BLD AUTO: 0.6 %
BILIRUB SERPL-MCNC: 0.7 MG/DL (ref 0.1–2)
BUN BLD-MCNC: 12 MG/DL (ref 7–18)
CALCIUM BLD-MCNC: 9.1 MG/DL (ref 8.5–10.1)
CHLORIDE SERPL-SCNC: 104 MMOL/L (ref 98–112)
CHOLEST SERPL-MCNC: 163 MG/DL (ref ?–200)
CO2 SERPL-SCNC: 29 MMOL/L (ref 21–32)
CREAT BLD-MCNC: 0.69 MG/DL
EOSINOPHIL # BLD AUTO: 0.03 X10(3) UL (ref 0–0.7)
EOSINOPHIL NFR BLD AUTO: 0.4 %
ERYTHROCYTE [DISTWIDTH] IN BLOOD BY AUTOMATED COUNT: 11.5 %
EST. AVERAGE GLUCOSE BLD GHB EST-MCNC: 94 MG/DL (ref 68–126)
FASTING PATIENT LIPID ANSWER: NO
FASTING STATUS PATIENT QL REPORTED: NO
GLOBULIN PLAS-MCNC: 3.5 G/DL (ref 2.8–4.4)
GLUCOSE BLD-MCNC: 85 MG/DL (ref 70–99)
HBA1C MFR BLD: 4.9 % (ref ?–5.7)
HCT VFR BLD AUTO: 42.3 %
HDLC SERPL-MCNC: 69 MG/DL (ref 40–59)
HGB BLD-MCNC: 13.6 G/DL
IMM GRANULOCYTES # BLD AUTO: 0.02 X10(3) UL (ref 0–1)
IMM GRANULOCYTES NFR BLD: 0.2 %
LDLC SERPL CALC-MCNC: 73 MG/DL (ref ?–100)
LYMPHOCYTES # BLD AUTO: 1.57 X10(3) UL (ref 1–4)
LYMPHOCYTES NFR BLD AUTO: 19.1 %
MCH RBC QN AUTO: 30.4 PG (ref 26–34)
MCHC RBC AUTO-ENTMCNC: 32.2 G/DL (ref 31–37)
MCV RBC AUTO: 94.4 FL
MONOCYTES # BLD AUTO: 0.44 X10(3) UL (ref 0.1–1)
MONOCYTES NFR BLD AUTO: 5.3 %
NEUTROPHILS # BLD AUTO: 6.12 X10 (3) UL (ref 1.5–7.7)
NEUTROPHILS # BLD AUTO: 6.12 X10(3) UL (ref 1.5–7.7)
NEUTROPHILS NFR BLD AUTO: 74.4 %
NONHDLC SERPL-MCNC: 94 MG/DL (ref ?–130)
OSMOLALITY SERPL CALC.SUM OF ELEC: 289 MOSM/KG (ref 275–295)
PLATELET # BLD AUTO: 259 10(3)UL (ref 150–450)
POTASSIUM SERPL-SCNC: 3.8 MMOL/L (ref 3.5–5.1)
PROT SERPL-MCNC: 7.2 G/DL (ref 6.4–8.2)
RBC # BLD AUTO: 4.48 X10(6)UL
SODIUM SERPL-SCNC: 140 MMOL/L (ref 136–145)
TRIGL SERPL-MCNC: 117 MG/DL (ref 30–149)
TSI SER-ACNC: 1.05 MIU/ML (ref 0.36–3.74)
VLDLC SERPL CALC-MCNC: 18 MG/DL (ref 0–30)
WBC # BLD AUTO: 8.2 X10(3) UL (ref 4–11)

## 2022-01-05 PROCEDURE — 80053 COMPREHEN METABOLIC PANEL: CPT

## 2022-01-05 PROCEDURE — 36415 COLL VENOUS BLD VENIPUNCTURE: CPT

## 2022-01-05 PROCEDURE — 83036 HEMOGLOBIN GLYCOSYLATED A1C: CPT

## 2022-01-05 PROCEDURE — 84443 ASSAY THYROID STIM HORMONE: CPT

## 2022-01-05 PROCEDURE — 80061 LIPID PANEL: CPT

## 2022-01-05 PROCEDURE — 85025 COMPLETE CBC W/AUTO DIFF WBC: CPT

## 2022-01-05 RX ORDER — ESCITALOPRAM OXALATE 5 MG/1
5 TABLET ORAL DAILY
Qty: 90 TABLET | Refills: 3 | Status: SHIPPED | OUTPATIENT
Start: 2022-01-05

## 2022-01-05 NOTE — TELEPHONE ENCOUNTER
Pt has completed labs please see message below.   30-0 Pended below for approval    Last OV relevant to medication: 10/26/21  Last refill date: 11/23/21     #/refills: 30-0  When pt was asked to return for OV: 1 yr  Upcoming appt/reason: none  Was pt inform

## 2022-01-05 NOTE — TELEPHONE ENCOUNTER
Patient is getting blood work drawn today and she only has one pill left and wants to make sure that it will be filled ASAP   [id number] [address] [facility], [address] [place] [address].  [phone], [phone]

## 2022-01-06 ENCOUNTER — PATIENT MESSAGE (OUTPATIENT)
Dept: INTERNAL MEDICINE CLINIC | Facility: CLINIC | Age: 37
End: 2022-01-06

## 2022-01-10 ENCOUNTER — TELEPHONE (OUTPATIENT)
Dept: INTERNAL MEDICINE CLINIC | Facility: CLINIC | Age: 37
End: 2022-01-10

## 2022-01-10 NOTE — TELEPHONE ENCOUNTER
Informed [facility] of D/C today.   Office will pull PATRICIA from [facility] Received: fax from [facility]. PT's Plan of Care for pt. Both Dr Huang Velasco and ortho ordered PT. Routed to Dr Huang Velasco for review. Should this be going to ortho? Also see other TE for Initial Eval Note.

## 2022-01-10 NOTE — TELEPHONE ENCOUNTER
Incoming (mail or fax):  fax  Received from:  [facility]   Documentation given to:   Incoming triage     Initial Eval note to be signed

## 2022-01-10 NOTE — TELEPHONE ENCOUNTER
Incoming (mail or fax):  fax  Received from:  [facility]  Documentation given to:   Incoming triage     Plan of care

## 2022-01-10 NOTE — TELEPHONE ENCOUNTER
Initial Eval Note received. See also TE with Plan of Care. Both Dr Andres Velarde and ortho ordered PT. Routed to Dr Andres Velarde for review. Should this be going to ortho?

## 2022-01-12 ENCOUNTER — MED REC SCAN ONLY (OUTPATIENT)
Dept: INTERNAL MEDICINE CLINIC | Facility: CLINIC | Age: 37
End: 2022-01-12

## 2022-02-08 ENCOUNTER — MED REC SCAN ONLY (OUTPATIENT)
Dept: INTERNAL MEDICINE CLINIC | Facility: CLINIC | Age: 37
End: 2022-02-08

## 2022-03-10 ENCOUNTER — MED REC SCAN ONLY (OUTPATIENT)
Dept: INTERNAL MEDICINE CLINIC | Facility: CLINIC | Age: 37
End: 2022-03-10

## 2022-03-22 ENCOUNTER — TELEPHONE (OUTPATIENT)
Dept: INTERNAL MEDICINE CLINIC | Facility: CLINIC | Age: 37
End: 2022-03-22

## 2022-03-22 NOTE — TELEPHONE ENCOUNTER
Received fax from Dr Sowmya Hall with [facility] for a consult on 3/22/22.   Patient saw Dr Karlee Zarate for left elbow pain and the information is in [facility]

## 2023-01-24 ENCOUNTER — TELEPHONE (OUTPATIENT)
Dept: INTERNAL MEDICINE CLINIC | Facility: CLINIC | Age: 38
End: 2023-01-24

## 2023-01-24 NOTE — TELEPHONE ENCOUNTER
Care Everywhere Records Received    Updated Care Everywhere:yes    Date of Service: 1/23/23    From What Facility: [facility] clinic    Speciality: n/a    Type of Record: consult     Document Placed:cielo incoming

## 2023-02-06 DIAGNOSIS — F41.9 ANXIETY: ICD-10-CM

## 2023-02-06 RX ORDER — ESCITALOPRAM OXALATE 5 MG/1
5 TABLET ORAL DAILY
Qty: 90 TABLET | Refills: 0 | Status: SHIPPED | OUTPATIENT
Start: 2023-02-06

## 2023-02-06 NOTE — TELEPHONE ENCOUNTER
Is this medication prescribed by the [facility] Providers? Yes     Did the patient contact the pharmacy directly?:  Yes they said to call PCP     Is patient out of meds or supply very low?:  Out     Medication Requested:  escitalopram 5 MG Oral Tab    Dose:      Is patient requesting a 30 or 90 day supply?:  90    Pharmacy name and phone # or location:  [address] [place] [address]. [phone], [phone]    Is the patient due for an appointment?: yes PE scheduled on 2/15/23 (if so, please schedule appt)    Additional Notes:      Please advise the patient refills take up to 72 business hours.

## 2023-02-06 NOTE — TELEPHONE ENCOUNTER
Last OV relevant to medication: 10/26/21  Last refill date: 1/5/22 #90/refills: 3  When pt was asked to return for OV: 10/26/22  Upcoming appt/reason:   Future Appointments   Date Time Provider Andrea Powell   2/15/2023 12:00 PM Maryse Gil MD [facility]   Was pt informed of any over due labs: message sent

## 2023-05-03 ENCOUNTER — OFFICE VISIT (OUTPATIENT)
Dept: INTERNAL MEDICINE CLINIC | Facility: CLINIC | Age: 38
End: 2023-05-03
Payer: COMMERCIAL

## 2023-05-03 VITALS
WEIGHT: 140 LBS | BODY MASS INDEX: 23.32 KG/M2 | TEMPERATURE: 99 F | OXYGEN SATURATION: 99 % | RESPIRATION RATE: 20 BRPM | HEART RATE: 85 BPM | DIASTOLIC BLOOD PRESSURE: 60 MMHG | HEIGHT: 65 IN | SYSTOLIC BLOOD PRESSURE: 108 MMHG

## 2023-05-03 DIAGNOSIS — N64.4 MASTODYNIA OF RIGHT BREAST: Primary | ICD-10-CM

## 2023-05-03 PROBLEM — R20.9 DISTURBANCE OF SKIN SENSATION: Status: RESOLVED | Noted: 2017-02-20 | Resolved: 2023-05-03

## 2023-05-03 PROBLEM — N30.00 ACUTE CYSTITIS WITHOUT HEMATURIA: Status: RESOLVED | Noted: 2020-01-15 | Resolved: 2023-05-03

## 2023-05-03 PROBLEM — Z90.49 S/P CHOLECYSTECTOMY: Status: ACTIVE | Noted: 2023-05-03

## 2023-05-03 PROCEDURE — 3074F SYST BP LT 130 MM HG: CPT | Performed by: INTERNAL MEDICINE

## 2023-05-03 PROCEDURE — 3008F BODY MASS INDEX DOCD: CPT | Performed by: INTERNAL MEDICINE

## 2023-05-03 PROCEDURE — 3078F DIAST BP <80 MM HG: CPT | Performed by: INTERNAL MEDICINE

## 2023-05-03 PROCEDURE — 99213 OFFICE O/P EST LOW 20 MIN: CPT | Performed by: INTERNAL MEDICINE

## 2023-05-08 DIAGNOSIS — F41.9 ANXIETY: ICD-10-CM

## 2023-05-09 RX ORDER — ESCITALOPRAM OXALATE 5 MG/1
5 TABLET ORAL DAILY
Qty: 90 TABLET | Refills: 0 | Status: SHIPPED | OUTPATIENT
Start: 2023-05-09

## 2023-05-09 NOTE — TELEPHONE ENCOUNTER
Last OV relevant to medication: 10/26/21  Last refill date: 2/6/23     #/refills: 90/0  When pt was asked to return for OV: 1 year  Upcoming appt/reason:PE none scheduled  Was pt informed of any over due labs: none

## 2023-05-10 ENCOUNTER — TELEPHONE (OUTPATIENT)
Dept: INTERNAL MEDICINE CLINIC | Facility: CLINIC | Age: 38
End: 2023-05-10

## 2023-05-10 DIAGNOSIS — N64.4 MASTODYNIA OF RIGHT BREAST: Primary | ICD-10-CM

## 2023-05-10 NOTE — TELEPHONE ENCOUNTER
[facility] [place] [id number] called and requests that Dr Pamela Dailey recent order from 5/3/23 be changed to:     \"Diagnostic bilateral on both breasts with ultrasound\"

## 2023-05-12 ENCOUNTER — HOSPITAL ENCOUNTER (OUTPATIENT)
Dept: MAMMOGRAPHY | Facility: HOSPITAL | Age: 38
Discharge: HOME | End: 2023-05-12
Attending: INTERNAL MEDICINE
Payer: COMMERCIAL

## 2023-05-12 DIAGNOSIS — N64.4 MASTODYNIA OF RIGHT BREAST: ICD-10-CM

## 2023-05-12 PROCEDURE — 77066 DX MAMMO INCL CAD BI: CPT | Performed by: INTERNAL MEDICINE

## 2023-05-12 PROCEDURE — 76642 ULTRASOUND BREAST LIMITED: CPT | Performed by: INTERNAL MEDICINE

## 2023-05-12 PROCEDURE — 77062 BREAST TOMOSYNTHESIS BI: CPT | Performed by: INTERNAL MEDICINE

## 2023-06-22 ENCOUNTER — OFFICE VISIT (OUTPATIENT)
Dept: INTERNAL MEDICINE CLINIC | Facility: CLINIC | Age: 38
End: 2023-06-22
Payer: COMMERCIAL

## 2023-06-22 VITALS
TEMPERATURE: 98 F | BODY MASS INDEX: 22.68 KG/M2 | DIASTOLIC BLOOD PRESSURE: 66 MMHG | OXYGEN SATURATION: 98 % | WEIGHT: 141.13 LBS | HEIGHT: 66 IN | RESPIRATION RATE: 14 BRPM | HEART RATE: 68 BPM | SYSTOLIC BLOOD PRESSURE: 116 MMHG

## 2023-06-22 DIAGNOSIS — F41.9 ANXIETY: ICD-10-CM

## 2023-06-22 DIAGNOSIS — Z13.29 SCREENING FOR THYROID DISORDER: ICD-10-CM

## 2023-06-22 DIAGNOSIS — Z00.00 ENCOUNTER FOR ANNUAL PHYSICAL EXAM: Primary | ICD-10-CM

## 2023-06-22 DIAGNOSIS — Z13.1 SCREENING FOR DIABETES MELLITUS: ICD-10-CM

## 2023-06-22 DIAGNOSIS — E04.1 THYROID NODULE: ICD-10-CM

## 2023-06-22 DIAGNOSIS — F90.0 ATTENTION DEFICIT HYPERACTIVITY DISORDER (ADHD), PREDOMINANTLY INATTENTIVE TYPE: ICD-10-CM

## 2023-06-22 DIAGNOSIS — Z13.220 SCREENING FOR LIPID DISORDERS: ICD-10-CM

## 2023-06-22 PROBLEM — Z90.49 S/P CHOLECYSTECTOMY: Status: RESOLVED | Noted: 2023-05-03 | Resolved: 2023-06-22

## 2023-06-22 PROCEDURE — 3074F SYST BP LT 130 MM HG: CPT | Performed by: NURSE PRACTITIONER

## 2023-06-22 PROCEDURE — 3008F BODY MASS INDEX DOCD: CPT | Performed by: NURSE PRACTITIONER

## 2023-06-22 PROCEDURE — 3078F DIAST BP <80 MM HG: CPT | Performed by: NURSE PRACTITIONER

## 2023-06-22 PROCEDURE — 99395 PREV VISIT EST AGE 18-39: CPT | Performed by: NURSE PRACTITIONER

## 2023-06-22 PROCEDURE — 99214 OFFICE O/P EST MOD 30 MIN: CPT | Performed by: NURSE PRACTITIONER

## 2023-06-22 RX ORDER — ESCITALOPRAM OXALATE 10 MG/1
10 TABLET ORAL DAILY
Qty: 90 TABLET | Refills: 3 | Status: SHIPPED | OUTPATIENT
Start: 2023-06-22

## 2023-06-22 NOTE — PATIENT INSTRUCTIONS
Get your labs done. You should be fasting for at least 10 hours. If you take a multivitamin with Biotin or any biotin product it should be held for 3 days prior to getting your labs done. Get the thyroid ultrasound scheduled. Increase the lexapro to 10 mg once daily. See the psychiatrist for management of ADHD.

## 2023-11-07 ENCOUNTER — HOSPITAL ENCOUNTER (OUTPATIENT)
Dept: ULTRASOUND IMAGING | Age: 38
Discharge: HOME | End: 2023-11-07
Attending: NURSE PRACTITIONER
Payer: COMMERCIAL

## 2023-11-07 DIAGNOSIS — E04.1 THYROID NODULE: ICD-10-CM

## 2023-11-07 PROCEDURE — 76536 US EXAM OF HEAD AND NECK: CPT | Performed by: NURSE PRACTITIONER

## 2023-11-08 ENCOUNTER — TELEMEDICINE (OUTPATIENT)
Dept: INTERNAL MEDICINE CLINIC | Facility: CLINIC | Age: 38
End: 2023-11-08

## 2023-11-08 DIAGNOSIS — E04.1 THYROID NODULE: ICD-10-CM

## 2023-11-08 DIAGNOSIS — R09.82 POST-NASAL DRIP: Primary | ICD-10-CM

## 2023-11-08 PROCEDURE — 99213 OFFICE O/P EST LOW 20 MIN: CPT | Performed by: INTERNAL MEDICINE

## 2023-11-08 NOTE — PROGRESS NOTES
Virtual Telephone Check-In    Shaylee Sheehan verbally consents to a Virtual/Telephone Check-In visit on 11/08/23. Patient has been referred to the [facility] website at [email or web address] to review the yearly Consent to Treat document. Patient understands and accepts financial responsibility for any deductible, co-insurance and/or co-pays associated with this service. [address]    CHIEF COMPLAINT:    Chief Complaint   Patient presents with    Allergies     Post nasal drip             HISTORY OF PRESENT ILLNESS: Audio and video visit done today due to coronavirus pandemic. Complains of post nasal drip for about a month or more. Feels mucus in the back of her throat. Feels she has to clear her throat all the time. Feels a globus sensation at times. Runny nose here and there. No sinus pain or pressure. No fever     history of thyroid nodule many years ago. history of hyperthyroid many years ago as a child. She saw endocrine 2 years ago and had a left thyroid nodule biopsied and it was benign. She was asked to follow up but did not. She is worried about the thyroid nodule so she had a thyroid us done yesterday. It showed a stable nodule in the left thyroid. REVIEW OF SYSTEMS:  See HPI    Current Medications:    Current Outpatient Medications   Medication Sig Dispense Refill    escitalopram 10 MG Oral Tab Take 1 tablet (10 mg total) by mouth daily. 90 tablet 3    ascorbic acid 100 MG Oral Tab Take by mouth As Directed. Fluocinonide 0.05 % External Ointment Apply 1 Application topically as needed. PAST MEDICAL, SOCIAL, AND FAMILY HISTORY:  Tobacco:    History   Smoking Status    Never   Smokeless Tobacco    Never       PHYSICAL EXAM:  There were no vitals taken for this visit. GENERAL: alert and oriented x3. No distress. EYES:no scleral icterus. LUNGS: no labored breathing, no dyspnea.      DATA:  Results for orders placed or performed in visit on 01/05/22   Lipid Panel   Result Value Ref Range    Cholesterol, Total 163 <200 mg/dL    HDL Cholesterol 69 (H) 40 - 59 mg/dL    Triglycerides 117 30 - 149 mg/dL    LDL Cholesterol 73 <100 mg/dL    VLDL 18 0 - 30 mg/dL    Non HDL Chol 94 <130 mg/dL    Patient Fasting for Lipid? No    TSH W Reflex To Free T4   Result Value Ref Range    TSH 1.050 0.358 - 3.740 mIU/mL   Hemoglobin A1C   Result Value Ref Range    HgbA1C 4.9 <5.7 %    Estimated Average Glucose 94 68 - 126 mg/dL   Comp Metabolic Panel (14)   Result Value Ref Range    Glucose 85 70 - 99 mg/dL    Sodium 140 136 - 145 mmol/L    Potassium 3.8 3.5 - 5.1 mmol/L    Chloride 104 98 - 112 mmol/L    CO2 29.0 21.0 - 32.0 mmol/L    Anion Gap 7 0 - 18 mmol/L    BUN 12 7 - 18 mg/dL    Creatinine 0.69 0.55 - 1.02 mg/dL    Calcium, Total 9.1 8.5 - 10.1 mg/dL    Calculated Osmolality 289 275 - 295 mOsm/kg    GFR, Non- 112 >=60    GFR, -American 129 >=60    AST 20 15 - 37 U/L    ALT 20 13 - 56 U/L    Alkaline Phosphatase 57 37 - 98 U/L    Bilirubin, Total 0.7 0.1 - 2.0 mg/dL    Total Protein 7.2 6.4 - 8.2 g/dL    Albumin 3.7 3.4 - 5.0 g/dL    Globulin  3.5 2.8 - 4.4 g/dL    A/G Ratio 1.1 1.0 - 2.0    Patient Fasting for CMP?  No    CBC W/ DIFFERENTIAL   Result Value Ref Range    WBC 8.2 4.0 - 11.0 x10(3) uL    RBC 4.48 3.80 - 5.30 x10(6)uL    HGB 13.6 12.0 - 16.0 g/dL    HCT 42.3 35.0 - 48.0 %    .0 150.0 - 450.0 10(3)uL    MCV 94.4 80.0 - 100.0 fL    MCH 30.4 26.0 - 34.0 pg    MCHC 32.2 31.0 - 37.0 g/dL    RDW 11.5 %    Neutrophil Absolute Prelim 6.12 1.50 - 7.70 x10 (3) uL    Neutrophil Absolute 6.12 1.50 - 7.70 x10(3) uL    Lymphocyte Absolute 1.57 1.00 - 4.00 x10(3) uL    Monocyte Absolute 0.44 0.10 - 1.00 x10(3) uL    Eosinophil Absolute 0.03 0.00 - 0.70 x10(3) uL    Basophil Absolute 0.05 0.00 - 0.20 x10(3) uL    Immature Granulocyte Absolute 0.02 0.00 - 1.00 x10(3) uL    Neutrophil % 74.4 %    Lymphocyte % 19.1 %    Monocyte % 5.3 %    Eosinophil % 0.4 % Basophil % 0.6 %    Immature Granulocyte % 0.2 %            ASSESSMENT AND PLAN:  1. Post-nasal drip  Trial zyrtec 10mg daily for a month. If good relief with that she can wean after a month to see if she still needs it. If symptoms return okay to take for another month. If no relief or if worsening pt to make ov for evaluation. 2. Thyroid nodule  Stable on us. Will have radiology comment on TR classification to determine further follow up. Pt understands phone/video evaluation is not a substitute for face to face examination or emergency care. Pt advised to go to the ER or call 911 for worsening symptoms or acute distress. Please note that the following visit was completed using two-way, real-time interactive audio and/or video communication. This has been done in good duglas to provide continuity of care in the best interest of the provider-patient relationship, due to the ongoing public health crisis/national emergency and because of restrictions of visitation. There are limitations of this visit as no physical exam could be performed. Every conscious effort was taken to allow for sufficient and adequate time. This billing was spent on reviewing labs, medications, radiology tests and decision making. Appropriate medical decision-making and tests are ordered as detailed in the plan of care above. Return if symptoms worsen or fail to improve.       Layne Jimenez MD

## 2023-11-09 ENCOUNTER — APPOINTMENT (OUTPATIENT)
Dept: ENDOCRINOLOGY | Age: 38
End: 2023-11-09

## 2024-01-09 ENCOUNTER — TELEMEDICINE (OUTPATIENT)
Dept: INTERNAL MEDICINE CLINIC | Facility: CLINIC | Age: 39
End: 2024-01-09
Payer: COMMERCIAL

## 2024-01-09 DIAGNOSIS — R09.82 POST-NASAL DRIP: Primary | ICD-10-CM

## 2024-01-09 PROCEDURE — 99213 OFFICE O/P EST LOW 20 MIN: CPT | Performed by: INTERNAL MEDICINE

## 2024-01-09 RX ORDER — MELOXICAM 7.5 MG/1
7.5 TABLET ORAL DAILY
COMMUNITY

## 2024-01-09 NOTE — PROGRESS NOTES
Virtual Telephone Check-In    Ayleen Hoffmann verbally consents to a Virtual/Telephone Check-In visit on 01/09/24.  Patient has been referred to the [facility] website at [email or web address] to review the yearly Consent to Treat document.    Patient understands and accepts financial responsibility for any deductible, co-insurance and/or co-pays associated with this service.        [facility]    CHIEF COMPLAINT:    Chief Complaint   Patient presents with    Sinus Problem     PND for over 6 months.          HISTORY OF PRESENT ILLNESS: Audio and video visit done today due to coronavirus pandemic.     Complains of post nasal drip. Has had it for about 6 months.   About 2 months ago had a televisit and started taking zyrtec daily. Has helped but it has not resolved.   Constant drip, constantly has to clear her posterior pharynx.   No sore throat. No fever. No sinus pain or pressure.   Taking flonase regularly as well.   Has allergies but has not affected her like this before. Usually takes an antihistamine for a couple of days and improves.   Has been very bothersome.       REVIEW OF SYSTEMS:  See HPI    Current Medications:    Current Outpatient Medications   Medication Sig Dispense Refill    Meloxicam 7.5 MG Oral Tab Take 1 tablet (7.5 mg total) by mouth daily.      escitalopram 10 MG Oral Tab Take 1 tablet (10 mg total) by mouth daily. 90 tablet 3    ascorbic acid 100 MG Oral Tab Take by mouth As Directed.      Fluocinonide 0.05 % External Ointment Apply 1 Application topically as needed.         PAST MEDICAL, SOCIAL, AND FAMILY HISTORY:  Tobacco:    History   Smoking Status    Never   Smokeless Tobacco    Never       PHYSICAL EXAM:  There were no vitals taken for this visit.   GENERAL: alert and oriented x3. No distress.   EYES:no scleral icterus.   LUNGS: no labored breathing, no dyspnea.     DATA:  Results for orders placed or performed in visit on 01/05/22   Lipid Panel   Result Value Ref Range     Cholesterol, Total 163 <200 mg/dL    HDL Cholesterol 69 (H) 40 - 59 mg/dL    Triglycerides 117 30 - 149 mg/dL    LDL Cholesterol 73 <100 mg/dL    VLDL 18 0 - 30 mg/dL    Non HDL Chol 94 <130 mg/dL    Patient Fasting for Lipid? No    TSH W Reflex To Free T4   Result Value Ref Range    TSH 1.050 0.358 - 3.740 mIU/mL   Hemoglobin A1C   Result Value Ref Range    HgbA1C 4.9 <5.7 %    Estimated Average Glucose 94 68 - 126 mg/dL   Comp Metabolic Panel (14)   Result Value Ref Range    Glucose 85 70 - 99 mg/dL    Sodium 140 136 - 145 mmol/L    Potassium 3.8 3.5 - 5.1 mmol/L    Chloride 104 98 - 112 mmol/L    CO2 29.0 21.0 - 32.0 mmol/L    Anion Gap 7 0 - 18 mmol/L    BUN 12 7 - 18 mg/dL    Creatinine 0.69 0.55 - 1.02 mg/dL    Calcium, Total 9.1 8.5 - 10.1 mg/dL    Calculated Osmolality 289 275 - 295 mOsm/kg    GFR, Non- 112 >=60    GFR, -American 129 >=60    AST 20 15 - 37 U/L    ALT 20 13 - 56 U/L    Alkaline Phosphatase 57 37 - 98 U/L    Bilirubin, Total 0.7 0.1 - 2.0 mg/dL    Total Protein 7.2 6.4 - 8.2 g/dL    Albumin 3.7 3.4 - 5.0 g/dL    Globulin  3.5 2.8 - 4.4 g/dL    A/G Ratio 1.1 1.0 - 2.0    Patient Fasting for CMP? No    CBC W/ DIFFERENTIAL   Result Value Ref Range    WBC 8.2 4.0 - 11.0 x10(3) uL    RBC 4.48 3.80 - 5.30 x10(6)uL    HGB 13.6 12.0 - 16.0 g/dL    HCT 42.3 35.0 - 48.0 %    .0 150.0 - 450.0 10(3)uL    MCV 94.4 80.0 - 100.0 fL    MCH 30.4 26.0 - 34.0 pg    MCHC 32.2 31.0 - 37.0 g/dL    RDW 11.5 %    Neutrophil Absolute Prelim 6.12 1.50 - 7.70 x10 (3) uL    Neutrophil Absolute 6.12 1.50 - 7.70 x10(3) uL    Lymphocyte Absolute 1.57 1.00 - 4.00 x10(3) uL    Monocyte Absolute 0.44 0.10 - 1.00 x10(3) uL    Eosinophil Absolute 0.03 0.00 - 0.70 x10(3) uL    Basophil Absolute 0.05 0.00 - 0.20 x10(3) uL    Immature Granulocyte Absolute 0.02 0.00 - 1.00 x10(3) uL    Neutrophil % 74.4 %    Lymphocyte % 19.1 %    Monocyte % 5.3 %    Eosinophil % 0.4 %    Basophil % 0.6 %    Immature  Granulocyte % 0.2 %            ASSESSMENT AND PLAN:  1. Post-nasal drip  Persistent and chronic.   Continue zyrtec and flonase but not providing enough relief.   May need to see ent for evaluation for nasal polyps or other cause of the chronic post nasal drip.   Referral done.   Offered visit to allergist to determine allergens and treatment, she politely declines as she already knows what she is allergic to. Referral for ent as below.   - ENT Referral - In Network        Pt understands phone/video evaluation is not a substitute for face to face examination or emergency care. Pt advised to go to the ER or call 911 for worsening symptoms or acute distress.      Please note that the following visit was completed using two-way, real-time interactive audio and/or video communication.  This has been done in good duglas to provide continuity of care in the best interest of the provider-patient relationship, due to the ongoing public health crisis/national emergency and because of restrictions of visitation.  There are limitations of this visit as no physical exam could be performed.  Every conscious effort was taken to allow for sufficient and adequate time.  This billing was spent on reviewing labs, medications, radiology tests and decision making.  Appropriate medical decision-making and tests are ordered as detailed in the plan of care above.     Return if symptoms worsen or fail to improve.      Susan Garcia MD

## 2024-02-15 ENCOUNTER — TELEMEDICINE (OUTPATIENT)
Dept: INTERNAL MEDICINE CLINIC | Facility: CLINIC | Age: 39
End: 2024-02-15
Payer: COMMERCIAL

## 2024-02-15 DIAGNOSIS — R05.1 ACUTE COUGH: Primary | ICD-10-CM

## 2024-02-15 RX ORDER — ALBUTEROL SULFATE 90 UG/1
2 AEROSOL, METERED RESPIRATORY (INHALATION) EVERY 4 HOURS PRN
Qty: 6.7 G | Refills: 0 | Status: SHIPPED | OUTPATIENT
Start: 2024-02-15

## 2024-02-15 RX ORDER — AZITHROMYCIN 250 MG/1
TABLET, FILM COATED ORAL
Qty: 6 TABLET | Refills: 0 | Status: SHIPPED | OUTPATIENT
Start: 2024-02-15 | End: 2024-02-19

## 2024-02-15 RX ORDER — CODEINE PHOSPHATE AND GUAIFENESIN 10; 100 MG/5ML; MG/5ML
5 SOLUTION ORAL 3 TIMES DAILY PRN
Qty: 180 ML | Refills: 0 | Status: SHIPPED | OUTPATIENT
Start: 2024-02-15

## 2024-02-15 NOTE — PATIENT INSTRUCTIONS
Use the cheratussin at night. Watch for side effects, effectiveness, and for allergy    Use the meds below for during the day    Use albuterol as needed. Monitor effectiveness and for side effects.    Keep well hydrated     You can use robitussin DM or mucinex DM as directed on the bottle for cough and chest congestion.      Use cepacol for sore throat and dry cough as needed.      Use tylenol as needed for pain or fever    Go to the ER for any emergent symptoms. If you cannot get there safely call 911.      Take antibiotic completely as ordered if started    Take antibiotic with food    Eat yogurt twice a day while on antibiotic or take an oral probiotic    Monitor for diarrhea, side effects, allergic reaction    If you have a mild allergic reaction take benadryl and call the office. If it is severe and you have lip or throat swelling or trouble breathing go to the ER or call 911    Follow up in one week as needed or when routine care is due      Viral Upper Respiratory Illness (Adult)    You have a viral upper respiratory illness (URI), which is another term for the common cold. This illness is contagious during the first few days. It is spread through the air by coughing and sneezing. It may also be spread by direct contact (touching the sick person and then touching your own eyes, nose, or mouth). Frequent handwashing will decrease risk of spread. Most viral illnesses go away within 7 to 10 days with rest and simple home remedies. Sometimes the illness may last for several weeks. Antibiotics will not kill a virus, and they are generally not prescribed for this condition.  Home care  If symptoms are severe, rest at home for the first 2 to 3 days. When you resume activity, don't let yourself get too tired.  Don't smoke. If you need help stopping, talk with your healthcare provider.  Avoid being exposed to cigarette smoke (yours or others’).  You may use acetaminophen or ibuprofen to control pain and fever, unless  another medicine was prescribed. If you have chronic liver or kidney disease, have ever had a stomach ulcer or gastrointestinal bleeding, or are taking blood-thinning medicines, talk with your healthcare provider before using these medicines. Aspirin should never be given to anyone under 18 years of age who is ill with a viral infection or fever. It may cause severe liver or brain damage.  Your appetite may be poor, so a light diet is fine. Stay well hydrated by drinking 6 to 8 glasses of fluids per day (water, soft drinks, juices, tea, or soup). Extra fluids will help loosen secretions in the nose and lungs.  Over-the-counter cold medicines will not shorten the length of time you’re sick, but they may be helpful for the following symptoms: cough, sore throat, and nasal and sinus congestion. If you take prescription medicines, ask your healthcare provider or pharmacist which over-the-counter medicines are safe to use. (Note: Don't use decongestants if you have high blood pressure.)  Follow-up care  Follow up with your healthcare provider, or as advised.  When to seek medical advice  Call your healthcare provider right away if any of these occur:  Cough with lots of colored sputum (mucus)  Severe headache; face, neck, or ear pain  Difficulty swallowing due to throat pain  Fever of 100.4°F (38°C) or higher, or as directed by your healthcare provider  Call 911  Call 911 if any of these occur:  Chest pain, shortness of breath, wheezing, or difficulty breathing  Coughing up blood  Very severe pain with swallowing, especially if it goes along with a muffled voice   Date Last Reviewed: 6/1/2018 © 2000-2019 [employer]. [address] [place] [address], [place] [address]. All rights reserved. This information is not intended as a substitute for professional medical care. Always follow your healthcare professional's instructions.

## 2024-02-15 NOTE — PROGRESS NOTES
Virtual video Check-In    Ayleen Hoffmann verbally consents to a Virtual/video Check-In visit on 02/15/24.  Patient has been referred to the [facility] website at [email or web address] to review the yearly Consent to Treat document.    Patient understands and accepts financial responsibility for any deductible, co-insurance and/or co-pays associated with this service.    Duration of the service: 15 minutes    Ayleen Hoffmann is a 38 year old female.  CHIEF COMPLAINT   URI symptoms     HPI:   Cough since 1-1.5 weeks ago, dry very frequent cough. No sob, chest or sinus congestion, GI symptoms, fatigue, aches, fever, chills. Constant cough at night and keeping her up.         Current Outpatient Medications   Medication Sig Dispense Refill    Meloxicam 7.5 MG Oral Tab Take 1 tablet (7.5 mg total) by mouth daily.      escitalopram 10 MG Oral Tab Take 1 tablet (10 mg total) by mouth daily. 90 tablet 3    ascorbic acid 100 MG Oral Tab Take by mouth As Directed.      Fluocinonide 0.05 % External Ointment Apply 1 Application topically as needed.        Past Medical History:   Diagnosis Date    Abdominal pain     Allergic rhinitis 2015    Anxiety     Bloating     Diarrhea, unspecified     Eczema     Hyperthyroidism     S/P cholecystectomy 05/03/2023    Thyroid disease       Social History:  Social History     Socioeconomic History    Marital status:     Number of children: 2   Tobacco Use    Smoking status: Never    Smokeless tobacco: Never   Vaping Use    Vaping Use: Never used   Substance and Sexual Activity    Alcohol use: Yes     Alcohol/week: 2.0 standard drinks of alcohol     Types: 2 Glasses of wine per week     Comment: 1 drink per week     Drug use: No    Sexual activity: Yes     Partners: Male   Other Topics Concern    Caffeine Concern Yes    Stress Concern No    Weight Concern Yes    Exercise Yes     Comment: Tennis    Seat Belt Yes   Social History Narrative    ** Merged History Encounter **              REVIEW OF SYSTEMS:   See HPI    EXAM:   Limited due to video visit  GENERAL: does not sound like they are in any acute distress on the video  LUNGS: They are able to phonate clearly without pausing due to sob, there was frequent dry coughing during the visit.  NEURO: Oriented times three      LABS:      Lab Results   Component Value Date    WBC 8.2 01/05/2022    RBC 4.48 01/05/2022    HGB 13.6 01/05/2022    HCT 42.3 01/05/2022    MCV 94.4 01/05/2022    MCH 30.4 01/05/2022    MCHC 32.2 01/05/2022    RDW 11.5 01/05/2022    .0 01/05/2022    MPV 11.8 (H) 01/08/2012      Lab Results   Component Value Date    GLU 85 01/05/2022    BUN 12 01/05/2022    BUNCREA NOT APPLICABLE 09/01/2015    CREATSERUM 0.69 01/05/2022    ANIONGAP 7 01/05/2022    GFRNAA 112 01/05/2022    GFRAA 129 01/05/2022    CA 9.1 01/05/2022    OSMOCALC 289 01/05/2022    ALKPHO 57 01/05/2022    AST 20 01/05/2022    ALT 20 01/05/2022    BILT 0.7 01/05/2022    TP 7.2 01/05/2022    ALB 3.7 01/05/2022    GLOBULIN 3.5 01/05/2022    AGRATIO 1.3 09/01/2015     01/05/2022    K 3.8 01/05/2022     01/05/2022    CO2 29.0 01/05/2022      Lab Results   Component Value Date    CHOLEST 163 01/05/2022    TRIG 117 01/05/2022    HDL 69 (H) 01/05/2022    LDL 73 01/05/2022    VLDL 18 01/05/2022    NONHDLC 94 01/05/2022      Lab Results   Component Value Date    TSH 1.050 01/05/2022      Lab Results   Component Value Date    EAG 94 01/05/2022    A1C 4.9 01/05/2022        ASSESSMENT AND PLAN:   1. Acute cough  - the patient had a likely viral URI initially. Still ongoing cough that is keeping her up at night. No sob or emergent symptoms.  - start OTC meds for supportive care  - cheratussin at night. Advised not to drive or drink alcohol while taking it and to keep it out of reach of children.   - alb inhaler as needed  - zpack only if chest congestion or sinus infection symptoms start  - to ER as needed for sob  - guaiFENesin-codeine (CHERATUSSIN AC)  100-10 MG/5ML Oral Solution; Take 5 mL by mouth 3 (three) times daily as needed for cough.  Dispense: 180 mL; Refill: 0  - albuterol (PROAIR HFA) 108 (90 Base) MCG/ACT Inhalation Aero Soln; Inhale 2 puffs into the lungs every 4 (four) hours as needed for Wheezing.  Dispense: 6.7 g; Refill: 0  - azithromycin (ZITHROMAX Z-GUIDO) 250 MG Oral Tab; Take 2 tablets (500 mg total) by mouth daily for 1 day, THEN 1 tablet (250 mg total) daily for 4 days.  Dispense: 6 tablet; Refill: 0      The patient indicates understanding of these issues and agrees to the plan.  The patient is asked to return as needed or when routine care is due.         Please note that the following visit was completed using two-way, real-time interactive audio and/or video communication.  This has been done in good duglas to provide continuity of care in the best interest of the provider-patient relationship, due to the ongoing public health crisis/national emergency and because of restrictions of visitation.  There are limitations of this visit as no physical exam could be performed.  Every conscious effort was taken to allow for sufficient and adequate time.  This billing was spent on reviewing labs, medications, radiology tests and decision making.  Appropriate medical decision-making and tests are ordered as detailed in the plan of care above.

## 2024-05-31 RX ORDER — METHYLPHENIDATE HYDROCHLORIDE 10 MG/1
10 CAPSULE, EXTENDED RELEASE ORAL EVERY MORNING
COMMUNITY

## 2024-05-31 RX ORDER — MULTIVIT-MIN/IRON/FOLIC ACID/K 18-600-40
1 CAPSULE ORAL DAILY
COMMUNITY

## 2024-06-21 ENCOUNTER — HOSPITAL ENCOUNTER (OUTPATIENT)
Facility: HOSPITAL | Age: 39
Setting detail: HOSPITAL OUTPATIENT SURGERY
Discharge: HOME | End: 2024-06-21
Attending: OBSTETRICS & GYNECOLOGY | Admitting: OBSTETRICS & GYNECOLOGY

## 2024-06-21 ENCOUNTER — ANESTHESIA (OUTPATIENT)
Dept: SURGERY | Facility: HOSPITAL | Age: 39
End: 2024-06-21

## 2024-06-21 ENCOUNTER — ANESTHESIA EVENT (OUTPATIENT)
Dept: SURGERY | Facility: HOSPITAL | Age: 39
End: 2024-06-21

## 2024-06-21 VITALS
RESPIRATION RATE: 20 BRPM | BODY MASS INDEX: 23.32 KG/M2 | HEIGHT: 65 IN | WEIGHT: 140 LBS | SYSTOLIC BLOOD PRESSURE: 118 MMHG | HEART RATE: 67 BPM | DIASTOLIC BLOOD PRESSURE: 78 MMHG | OXYGEN SATURATION: 100 % | TEMPERATURE: 98 F

## 2024-06-21 LAB — B-HCG UR QL: NEGATIVE

## 2024-06-21 PROCEDURE — S0028 INJECTION, FAMOTIDINE, 20 MG: HCPCS | Performed by: NURSE ANESTHETIST, CERTIFIED REGISTERED

## 2024-06-21 PROCEDURE — 0U2DXHZ CHANGE CONTRACEPTIVE DEVICE IN UTERUS AND CERVIX, EXTERNAL APPROACH: ICD-10-PCS | Performed by: OBSTETRICS & GYNECOLOGY

## 2024-06-21 PROCEDURE — 81025 URINE PREGNANCY TEST: CPT

## 2024-06-21 DEVICE — MIRENA IUD: Type: IMPLANTABLE DEVICE | Status: FUNCTIONAL

## 2024-06-21 RX ORDER — HYDROMORPHONE HYDROCHLORIDE 1 MG/ML
0.4 INJECTION, SOLUTION INTRAMUSCULAR; INTRAVENOUS; SUBCUTANEOUS EVERY 5 MIN PRN
Status: DISCONTINUED | OUTPATIENT
Start: 2024-06-21 | End: 2024-06-21

## 2024-06-21 RX ORDER — DEXAMETHASONE SODIUM PHOSPHATE 4 MG/ML
VIAL (ML) INJECTION AS NEEDED
Status: DISCONTINUED | OUTPATIENT
Start: 2024-06-21 | End: 2024-06-21 | Stop reason: SURG

## 2024-06-21 RX ORDER — FAMOTIDINE 10 MG/ML
INJECTION, SOLUTION INTRAVENOUS AS NEEDED
Status: DISCONTINUED | OUTPATIENT
Start: 2024-06-21 | End: 2024-06-21 | Stop reason: SURG

## 2024-06-21 RX ORDER — HYDROCODONE BITARTRATE AND ACETAMINOPHEN 5; 325 MG/1; MG/1
2 TABLET ORAL ONCE AS NEEDED
Status: COMPLETED | OUTPATIENT
Start: 2024-06-21 | End: 2024-06-21

## 2024-06-21 RX ORDER — LIDOCAINE HYDROCHLORIDE 10 MG/ML
INJECTION, SOLUTION EPIDURAL; INFILTRATION; INTRACAUDAL; PERINEURAL AS NEEDED
Status: DISCONTINUED | OUTPATIENT
Start: 2024-06-21 | End: 2024-06-21 | Stop reason: SURG

## 2024-06-21 RX ORDER — HYDROCODONE BITARTRATE AND ACETAMINOPHEN 5; 325 MG/1; MG/1
1 TABLET ORAL ONCE AS NEEDED
Status: COMPLETED | OUTPATIENT
Start: 2024-06-21 | End: 2024-06-21

## 2024-06-21 RX ORDER — MEPERIDINE HYDROCHLORIDE 25 MG/ML
12.5 INJECTION INTRAMUSCULAR; INTRAVENOUS; SUBCUTANEOUS AS NEEDED
Status: DISCONTINUED | OUTPATIENT
Start: 2024-06-21 | End: 2024-06-21

## 2024-06-21 RX ORDER — ONDANSETRON 2 MG/ML
4 INJECTION INTRAMUSCULAR; INTRAVENOUS EVERY 6 HOURS PRN
Status: DISCONTINUED | OUTPATIENT
Start: 2024-06-21 | End: 2024-06-21

## 2024-06-21 RX ORDER — HYDROMORPHONE HYDROCHLORIDE 1 MG/ML
0.2 INJECTION, SOLUTION INTRAMUSCULAR; INTRAVENOUS; SUBCUTANEOUS EVERY 5 MIN PRN
Status: DISCONTINUED | OUTPATIENT
Start: 2024-06-21 | End: 2024-06-21

## 2024-06-21 RX ORDER — HYDROMORPHONE HYDROCHLORIDE 1 MG/ML
0.6 INJECTION, SOLUTION INTRAMUSCULAR; INTRAVENOUS; SUBCUTANEOUS EVERY 5 MIN PRN
Status: DISCONTINUED | OUTPATIENT
Start: 2024-06-21 | End: 2024-06-21

## 2024-06-21 RX ORDER — DIPHENHYDRAMINE HYDROCHLORIDE 50 MG/ML
12.5 INJECTION INTRAMUSCULAR; INTRAVENOUS AS NEEDED
Status: DISCONTINUED | OUTPATIENT
Start: 2024-06-21 | End: 2024-06-21

## 2024-06-21 RX ORDER — PROCHLORPERAZINE EDISYLATE 5 MG/ML
5 INJECTION INTRAMUSCULAR; INTRAVENOUS EVERY 8 HOURS PRN
Status: DISCONTINUED | OUTPATIENT
Start: 2024-06-21 | End: 2024-06-21

## 2024-06-21 RX ORDER — ACETAMINOPHEN 500 MG
1000 TABLET ORAL ONCE AS NEEDED
Status: COMPLETED | OUTPATIENT
Start: 2024-06-21 | End: 2024-06-21

## 2024-06-21 RX ORDER — SCOLOPAMINE TRANSDERMAL SYSTEM 1 MG/1
1 PATCH, EXTENDED RELEASE TRANSDERMAL ONCE
Status: DISCONTINUED | OUTPATIENT
Start: 2024-06-21 | End: 2024-06-21 | Stop reason: HOSPADM

## 2024-06-21 RX ORDER — ACETAMINOPHEN 500 MG
1000 TABLET ORAL ONCE
Status: DISCONTINUED | OUTPATIENT
Start: 2024-06-21 | End: 2024-06-21 | Stop reason: HOSPADM

## 2024-06-21 RX ORDER — MIDAZOLAM HYDROCHLORIDE 1 MG/ML
INJECTION INTRAMUSCULAR; INTRAVENOUS AS NEEDED
Status: DISCONTINUED | OUTPATIENT
Start: 2024-06-21 | End: 2024-06-21 | Stop reason: SURG

## 2024-06-21 RX ORDER — ONDANSETRON 2 MG/ML
INJECTION INTRAMUSCULAR; INTRAVENOUS AS NEEDED
Status: DISCONTINUED | OUTPATIENT
Start: 2024-06-21 | End: 2024-06-21 | Stop reason: SURG

## 2024-06-21 RX ORDER — NALOXONE HYDROCHLORIDE 0.4 MG/ML
0.08 INJECTION, SOLUTION INTRAMUSCULAR; INTRAVENOUS; SUBCUTANEOUS AS NEEDED
Status: DISCONTINUED | OUTPATIENT
Start: 2024-06-21 | End: 2024-06-21

## 2024-06-21 RX ORDER — SODIUM CHLORIDE, SODIUM LACTATE, POTASSIUM CHLORIDE, CALCIUM CHLORIDE 600; 310; 30; 20 MG/100ML; MG/100ML; MG/100ML; MG/100ML
INJECTION, SOLUTION INTRAVENOUS CONTINUOUS
Status: DISCONTINUED | OUTPATIENT
Start: 2024-06-21 | End: 2024-06-21

## 2024-06-21 RX ORDER — KETOROLAC TROMETHAMINE 30 MG/ML
INJECTION, SOLUTION INTRAMUSCULAR; INTRAVENOUS AS NEEDED
Status: DISCONTINUED | OUTPATIENT
Start: 2024-06-21 | End: 2024-06-21 | Stop reason: SURG

## 2024-06-21 RX ADMIN — ONDANSETRON 4 MG: 2 INJECTION INTRAMUSCULAR; INTRAVENOUS at 11:01:00

## 2024-06-21 RX ADMIN — KETOROLAC TROMETHAMINE 30 MG: 30 INJECTION, SOLUTION INTRAMUSCULAR; INTRAVENOUS at 11:15:00

## 2024-06-21 RX ADMIN — LIDOCAINE HYDROCHLORIDE 50 MG: 10 INJECTION, SOLUTION EPIDURAL; INFILTRATION; INTRACAUDAL; PERINEURAL at 10:54:00

## 2024-06-21 RX ADMIN — SODIUM CHLORIDE, SODIUM LACTATE, POTASSIUM CHLORIDE, CALCIUM CHLORIDE: 600; 310; 30; 20 INJECTION, SOLUTION INTRAVENOUS at 10:48:00

## 2024-06-21 RX ADMIN — DEXAMETHASONE SODIUM PHOSPHATE 4 MG: 4 MG/ML VIAL (ML) INJECTION at 10:56:00

## 2024-06-21 RX ADMIN — FAMOTIDINE 20 MG: 10 INJECTION, SOLUTION INTRAVENOUS at 10:48:00

## 2024-06-21 RX ADMIN — MIDAZOLAM HYDROCHLORIDE 2 MG: 1 INJECTION INTRAMUSCULAR; INTRAVENOUS at 10:48:00

## 2024-06-21 NOTE — ANESTHESIA PREPROCEDURE EVALUATION
PRE-OP EVALUATION    Patient Name: Ayleen Hoffmann    Admit Diagnosis: RETURNED MIRENA IUD    Pre-op Diagnosis: RETURNED MIRENA IUD    HYSTEROSCOPIC REMOVAL OF MIRENA INTRAUTERINE DEVICE WITH MIRENA INTRAUTERINE DEVICE REPLACEMENT    Anesthesia Procedure: HYSTEROSCOPIC REMOVAL OF MIRENA INTRAUTERINE DEVICE WITH MIRENA INTRAUTERINE DEVICE REPLACEMENT    Surgeons and Role:     * Linda Bullock MD - Primary    Pre-op vitals reviewed.  Temp: 97.1 °F (36.2 °C)  Pulse: 65  Resp: 16  BP: 119/85     Body mass index is 23.3 kg/m².    Current medications reviewed.  Hospital Medications:   acetaminophen (Tylenol Extra Strength) tab 1,000 mg  1,000 mg Oral Once    scopolamine (Transderm-Scop) 1 MG/3DAYS patch 1 patch  1 patch Transdermal Once    lactated ringers infusion   Intravenous Continuous       Outpatient Medications:     Medications Prior to Admission   Medication Sig Dispense Refill Last Dose    Methylphenidate HCl ER, CD, 10 MG Oral Cap CR Take 1 capsule (10 mg total) by mouth every morning.   6/20/2024    ascorbic acid 100 MG Oral Tab Take by mouth As Directed.   6/19/2024    Cholecalciferol (VITAMIN D) 50 MCG (2000 UT) Oral Cap Take 1 capsule (2,000 Units total) by mouth daily.   More than a month    escitalopram 10 MG Oral Tab Take 1 tablet (10 mg total) by mouth daily. 90 tablet 3 More than a month    Fluocinonide 0.05 % External Ointment Apply 1 Application topically as needed.   More than a month       Allergies: Dust mite extract, Other, Pollen, Penicillins, and Dog epithelium      Anesthesia Evaluation    Patient summary reviewed.    Anesthetic Complications           GI/Hepatic/Renal    Negative GI/hepatic/renal ROS.                             Cardiovascular    Negative cardiovascular ROS.                                                   Endo/Other    Negative endo/other ROS.                              Pulmonary    Negative pulmonary ROS.                       Neuro/Psych    Negative neuro/psych  ROS.                                  Past Surgical History:   Procedure Laterality Date      10/03/2014      2016    Colonoscopy      Laparoscopic cholecystectomy  2014    Procedure: LAPAROSCOPIC CHOLECYSTECTOMY;  Surgeon: Cy Brian MD;  Location:  MAIN OR    Other surgical history      eye surgery as a baby. Left eye     Social History     Socioeconomic History    Marital status:     Number of children: 2   Tobacco Use    Smoking status: Never    Smokeless tobacco: Never   Vaping Use    Vaping status: Never Used   Substance and Sexual Activity    Alcohol use: Yes     Alcohol/week: 2.0 standard drinks of alcohol     Types: 2 Glasses of wine per week     Comment: 1 drink per week     Drug use: No    Sexual activity: Yes     Partners: Male   Other Topics Concern    Caffeine Concern Yes    Stress Concern No    Weight Concern Yes    Exercise Yes     Comment: Tennis    Seat Belt Yes     History   Drug Use No     Available pre-op labs reviewed.               Airway      Mallampati: II       Cardiovascular    Cardiovascular exam normal.         Dental    Dentition appears grossly intact         Pulmonary    Pulmonary exam normal.                 Other findings              ASA: 2   Plan: general  NPO status verified and           Plan/risks discussed with: patient                Present on Admission:  **None**

## 2024-06-21 NOTE — ANESTHESIA POSTPROCEDURE EVALUATION
[facility]    Ayleen Yao [place] Patient Status:  Hospital Outpatient Surgery   Age/Gender 38 year old female MRN [id number]   Location [facility] SURGERY Attending Linda Bullock MD   Hosp Day # 0 PCP Susan Garcia MD       Anesthesia Post-op Note    HYSTEROSCOPIC REMOVAL OF MIRENA INTRAUTERINE DEVICE WITH MIRENA INTRAUTERINE DEVICE REPLACEMENT    Procedure Summary       Date: 06/21/24 Room / Location:  MAIN OR [address] [place] [address] MAIN OR    Anesthesia Start: 1048 Anesthesia Stop: 1128    Procedure: HYSTEROSCOPIC REMOVAL OF MIRENA INTRAUTERINE DEVICE WITH MIRENA INTRAUTERINE DEVICE REPLACEMENT (Vagina ) Diagnosis: (RETURNED MIRENA IUD)    Surgeons: Linda Bullock MD Anesthesiologist: Varinder Cifuentes MD    Anesthesia Type: MAC ASA Status: 2            Anesthesia Type: MAC    Vitals Value Taken Time   /95 06/21/24 1130   Temp 97.6 °F (36.4 °C) 06/21/24 1130   Pulse 59 06/21/24 1130   Resp 16 06/21/24 1130   SpO2 100 % 06/21/24 1130       Patient Location: Same Day Surgery    Anesthesia Type: MAC    Airway Patency: patent    Postop Pain Control: adequate    Mental Status: mildly sedated but able to meaningfully participate in the post-anesthesia evaluation    Nausea/Vomiting: none    Cardiopulmonary/Hydration status: stable euvolemic    Complications: no apparent anesthesia related complications    Postop vital signs: stable    Comments: report given to RN. Pt following commands, breathing comfortably, VSS.     Dental Exam: Unchanged from Preop    Patient to be discharged from PACU when criteria met.

## 2024-06-21 NOTE — DISCHARGE INSTRUCTIONS
Home Care Instructions  DILATATION AND CURETTAGE (D&C)  Dr A Bromberger. Dr Bullock    IMPORTANT POINTS TO KNOW  1. Minimal activity is preferred for two or three days (No heavy lifting, straining or strenuous activity.)  2. Showering is preferred to bathing for three to four days.  3. It is important to wipe from the front to the back after elimination.  4. No sexual intercourse, douching, or use of tampons for 14 days  5. Vaginal bleeding or spotting may continue for a week after the procedure  6. Mild cramping may continue for two or three days after the procedure. A mild analgesic, like Aleve or Advil, should relieve the cramping.    NOTIFY THE OFFICE OF  1. Vaginal bleeding heavier than a menstrual period  2. Vaginal discharge that burns, irritate or has a foul odor  3. Chills or temperature of 100.8 degrees of higher  4. Severe lower abdominal cramps or pain  5. Frequency, urgency, and/or burning with urination.    The above listed information is meant to be a guide only, if you have problems or questions not answered on this sheet, please contact our office.      [facility]  [phone]    Next Ibuprofen dose: 5:15pm  Next Tylenol dose: 6:00pm

## 2024-06-21 NOTE — H&P
[facility]   part of [facility]    History and Physical    Ayleen Hoffmann Patient Status:  Hospital Outpatient Surgery    1985 MRN [id number]   Location [facility] PRE OP HOLDING Attending Linda Bullock MD   Hosp Day # 0 PCP Susan Garcia MD     Date:  2024  Date of Admission:  2024    History provided by:patient  HPI:   No chief complaint on file.    38 year old  with malpositioned Mirena IUD.  Mirena placed 7 years ago and had failed removal in office as strings broke from base. Hysteroscope in office showed that IUD arm embedded in uterine wall.  Scheduled today for removal of mirena via htsteroscopy and re-insertion of Mirena.        History     Past Medical History:    Abdominal pain    Allergic rhinitis    Anxiety    Bloating    Diarrhea, unspecified    Disorder of thyroid    Around age 19 - no current treatment; previous treatment with methimazole, inderal, and PHILLIP    Eczema    Hx of motion sickness    Hyperthyroidism    S/P cholecystectomy    Thyroid disease    Visual impairment    no contacts/glasses     Past Surgical History:   Procedure Laterality Date      10/03/2014      2016    Colonoscopy      Laparoscopic cholecystectomy  2014    Procedure: LAPAROSCOPIC CHOLECYSTECTOMY;  Surgeon: Cy Brian MD;  Location:  MAIN OR    Other surgical history      eye surgery as a baby. Left eye     Family History   Problem Relation Age of Onset    Cancer Maternal Grandmother 81        lung cancer    Other (Other) Maternal Grandfather         brain tumor    Cancer Mother     Other (Other) Mother         Fibroids     Social History:  Social History     Socioeconomic History    Marital status:     Number of children: 2   Tobacco Use    Smoking status: Never    Smokeless tobacco: Never   Vaping Use    Vaping status: Never Used   Substance and Sexual Activity    Alcohol use: Yes     Alcohol/week: 2.0 standard drinks of alcohol      Types: 2 Glasses of wine per week     Comment: 1 drink per week     Drug use: No    Sexual activity: Yes     Partners: Male   Other Topics Concern    Caffeine Concern Yes    Stress Concern No    Weight Concern Yes    Exercise Yes     Comment: Tennis    Seat Belt Yes   Social History Narrative    ** Merged History Encounter **          Social Determinants of Health      [facility]    Housing Stability     Allergies/Medications:   Allergies:   Allergies   Allergen Reactions    Dust Mite Extract OTHER (SEE COMMENTS)    Other OTHER (SEE COMMENTS)     Environmental - Trees, pollens, grass    Pollen HIVES    Penicillins OTHER (SEE COMMENTS)     Pt is unsure of reaction-had allergy testing    Dog Epithelium ITCHING     Medications Prior to Admission   Medication Sig    Methylphenidate HCl ER, CD, 10 MG Oral Cap CR Take 1 capsule (10 mg total) by mouth every morning.    ascorbic acid 100 MG Oral Tab Take by mouth As Directed.    Cholecalciferol (VITAMIN D) 50 MCG (2000 UT) Oral Cap Take 1 capsule (2,000 Units total) by mouth daily.    escitalopram 10 MG Oral Tab Take 1 tablet (10 mg total) by mouth daily.    Fluocinonide 0.05 % External Ointment Apply 1 Application topically as needed.       Review of Systems:     Constitutional: Negative.    HENT: Negative.     Eyes: Negative.    Respiratory: Negative.     Cardiovascular: Negative.    Gastrointestinal: Negative.    Endocrine: Negative.    Genitourinary: Negative.    Musculoskeletal: Negative.    Skin: Negative.    Allergic/Immunologic: Negative.    Neurological: Negative.    Hematological: Negative.    Psychiatric/Behavioral: Negative.         Physical Exam:   Vital Signs:  Blood pressure 119/85, pulse 65, temperature 97.1 °F (36.2 °C), temperature source Temporal, resp. rate 16, height 5' 5\" (1.651 m), weight 140 lb (63.5 kg), not currently breastfeeding.  Physical Exam  Vitals reviewed.   Cardiovascular:       Rate and Rhythm: Normal rate.   Pulmonary:      Effort: Pulmonary effort is normal.   Abdominal:      General: Abdomen is flat.      Palpations: Abdomen is soft.   Genitourinary:     Comments: In office - normal.  Skin:     General: Skin is warm.   Neurological:      General: No focal deficit present.      Mental Status: She is alert.   Psychiatric:         Mood and Affect: Mood normal.       Cervical Papanicolaou done within 1 year of adm    Results:     Lab Results   Component Value Date    WBC 8.2 01/05/2022    HGB 13.6 01/05/2022    HCT 42.3 01/05/2022    .0 01/05/2022    CREATSERUM 0.69 01/05/2022    BUN 12 01/05/2022     01/05/2022    K 3.8 01/05/2022     01/05/2022    CO2 29.0 01/05/2022    GLU 85 01/05/2022    CA 9.1 01/05/2022    ALB 3.7 01/05/2022    ALKPHO 57 01/05/2022    BILT 0.7 01/05/2022    TP 7.2 01/05/2022    AST 20 01/05/2022    ALT 20 01/05/2022    TSH 1.050 01/05/2022     02/18/2014    DDIMER 0.52 (H) 02/17/2018    MG 1.4 (L) 02/19/2014    TROP <0.046 02/17/2018    RPR Nonreactive 06/16/2016    B12 616 01/27/2017     No results found.        Assessment/Plan:    Malpositioned IUD  Plan hysteroscopic removal of Mirena IUD with replacement of Mirena IUD.              Linda Bullock MD  6/21/2024

## 2024-06-22 NOTE — OPERATIVE REPORT
[facility]    PATIENT'S NAME: MARGUERITE RENE   ATTENDING PHYSICIAN: Linda Bullock M.D.   OPERATING PHYSICIAN: Linda Bullock M.D.   PATIENT ACCOUNT#:   [id number]    LOCATION:  [facility] 22 ED 10  MEDICAL RECORD #:   [id number]       YOB: 1985  ADMISSION DATE:       06/21/2024      OPERATION DATE:  06/21/2024    OPERATIVE REPORT      PREOPERATIVE DIAGNOSIS:  Malpositioned Mirena IUD.  POSTOPERATIVE DIAGNOSIS:  Malpositioned Mirena IUD.  PROCEDURE:  Hysteroscopic removal of IUD followed by replacement of Mirena IUD.      ANESTHESIA:  MAC.    COMPLICATIONS:  None.    ESTIMATED BLOOD LOSS:  Minimal.    FLUIDS:  800 mL of LR.      URINE OUTPUT:  100 mL prior to the procedure, and base deficit was actually -100.    OPERATIVE TECHNIQUE:  The patient was taken to the operating room with IV fluids running.  She was then placed under anesthesia without difficulty.  She was next placed in dorsal lithotomy position and prepped and draped in the usual sterile fashion.  At this time, a surgical time-out was performed, confirming patient and procedure.  Following this, a weighted speculum was placed vaginally in an effort to view the anterior lip of the cervix.  This was then grasped with a single-tooth tenaculum.  The cervix was then gently dilated to 6 mm.  The 6 mm hysteroscope was introduced into the cervical canal, and visualization was taken up into the endometrial cavity.  The Mirena IUD was noted to be seated in the lower uterine cervical segment.  At this time, the graspers were introduced through the hysteroscope.  The base of the IUD was grasped with the grasper forceps, and the hysteroscope as well as the Mirena were removed all in one retraction effort.  The Mirena IUD was removed intact, and the hysteroscope was then turned off.  Following this, the new Mirena IUD with a lot #[id number] with an expiration of 2026 August was introduced at a 7 cm sound.  The device was then  deployed inside the uterine cavity, and the strings were cut at approximately 2 cm.  Following this, all instruments were removed.  There was minimal bleeding noted at the tenaculum site.  The patient was then awakened from anesthesia and taken to Recovery in stable condition.    Dictated By Linda Bullock M.D.  d: 06/22/2024 11:48:07  t: 06/22/2024 13:43:15  [facility] [id number]  GENNARO/

## 2024-07-11 ENCOUNTER — OFFICE VISIT (OUTPATIENT)
Dept: INTERNAL MEDICINE CLINIC | Facility: CLINIC | Age: 39
End: 2024-07-11
Payer: COMMERCIAL

## 2024-07-11 ENCOUNTER — TELEPHONE (OUTPATIENT)
Dept: INTERNAL MEDICINE CLINIC | Facility: CLINIC | Age: 39
End: 2024-07-11

## 2024-07-11 VITALS
RESPIRATION RATE: 18 BRPM | SYSTOLIC BLOOD PRESSURE: 102 MMHG | OXYGEN SATURATION: 99 % | TEMPERATURE: 97 F | DIASTOLIC BLOOD PRESSURE: 64 MMHG | HEART RATE: 86 BPM | BODY MASS INDEX: 23.16 KG/M2 | WEIGHT: 139 LBS | HEIGHT: 65 IN

## 2024-07-11 DIAGNOSIS — R51.9 HEADACHE, UNSPECIFIED HEADACHE TYPE: ICD-10-CM

## 2024-07-11 DIAGNOSIS — R10.9 ABDOMINAL PAIN, UNSPECIFIED ABDOMINAL LOCATION: Primary | ICD-10-CM

## 2024-07-11 DIAGNOSIS — R10.2 PELVIC PAIN: Primary | ICD-10-CM

## 2024-07-11 DIAGNOSIS — M54.6 ACUTE MIDLINE THORACIC BACK PAIN: ICD-10-CM

## 2024-07-11 PROCEDURE — 99214 OFFICE O/P EST MOD 30 MIN: CPT | Performed by: INTERNAL MEDICINE

## 2024-07-11 NOTE — TELEPHONE ENCOUNTER
Patient called back. Asking if all her symptoms are related to recent issues w/iud and if she has pelvic inflammatory disease (patient searching internet, advised not to). Endorses 2 weeks before her symptoms started she had issues with IUD.saw dr. Bullock. [place] [employer]. Issues w/tools to remove. Week later people from iud company came with new tools, still no luck. Had to have outpatient surgery to have removed and replaced. Patient wants to know if this is all connected. Patient not happy with gyne. Advised provider may say to reach out to gyne. Patient endorses what are they going to do that we can't see on blood work etc. Please advise-thanks!

## 2024-07-11 NOTE — PROGRESS NOTES
[facility]    CHIEF COMPLAINT:    Chief Complaint   Patient presents with    Pain     Patient states she has lower back or stomach pain x 5 days     Headache     C/o headaches x 3 days          HISTORY OF PRESENT ILLNESS:  Complains of pain in mid back and abdomen for 4-5 days.   She is not sure if it started after playing tennis. She felt a twinge in her back the day she played tennis but was able to complete the game. Pain started the day after.   She is unable to localize if it is back or abdomen.   She has gerd. Has been taking omeprazole regularly for this.   When she goes over bumps she feels the pain in her back.   She has a history of low back pain with radiculopathy in 2022. Had steroid injections then.   Currently does not have any radicular pain. Pain is described as an ache. 5/10 in severity.   No urinary complaints.   No diarrhea, nausea, vomiting. Appetite is normal.   No numbness, tingling, weakness. No gait abnormality.     She also has a headache for 2-3 days. Doesn't usually get a headache. No vision changes, no numbness, no tingling, no weakness, no speech difficulty. Constant.     No fever.   A week ago she had some sweats at night but none since then.   No chest pain or shortness of breath.     REVIEW OF SYSTEMS:  See HPI    Current Medications:    Current Outpatient Medications   Medication Sig Dispense Refill    Cholecalciferol (VITAMIN D) 50 MCG (2000 UT) Oral Cap Take 1 capsule (2,000 Units total) by mouth daily.      Methylphenidate HCl ER, CD, 10 MG Oral Cap CR Take 1 capsule (10 mg total) by mouth every morning.      escitalopram 10 MG Oral Tab Take 1 tablet (10 mg total) by mouth daily. 90 tablet 3    ascorbic acid 100 MG Oral Tab Take by mouth As Directed.      Fluocinonide 0.05 % External Ointment Apply 1 Application topically as needed.         PAST MEDICAL, SOCIAL, AND FAMILY HISTORY:  Tobacco:    History   Smoking Status    Never   Smokeless Tobacco    Never       PHYSICAL  EXAM:  /64   Pulse 86   Temp 97 °F (36.1 °C)   Resp 18   Ht 5' 5\" (1.651 m)   Wt 139 lb (63 kg)   LMP  (LMP Unknown)   SpO2 99%   BMI 23.13 kg/m²    GENERAL: well developed, well nourished,in no apparent distress  EYES:perrla  LUNGS: clear to auscultation  CARDIO: RRR without murmur  GI: normal bowel sounds, mild abdominal tenderness to palpation in the epigastrium and in the mid abdomen, no rebound or guarding, no hsm, abdomen is soft, no peritoneal signs.   MUSCULOSKELETAL: no back tenderness to palpation. No cva tenderness.   EXTREMITIES:  no edema, muscle strength normal.   NEURO: negative straight leg raise, muscle strength normal, reflexes normal.     DATA:  Results for orders placed or performed during the hospital encounter of 06/21/24   POCT Pregnancy, Urine   Result Value Ref Range    POCT Urine Pregnancy Negative Negative            ASSESSMENT AND PLAN:  1. Abdominal pain, unspecified abdominal location  Unclear etiology.   Will check labs.   Continue omeprazole.  If labs unrevealing and pain persists may need imaging.   See gi for persistent symptoms.   Rtc or go to the ER for worsening symptoms.   - CBC With Differential With Platelet  - Comp Metabolic Panel (14)  - Lipase  - Urinalysis with Culture Reflex  - EVALUATE & TREAT, GASTRO (INTERNAL)    2. Headache, unspecified headache type  No clear etiology.   May be stress related sec to back and abdominal pain.   Monitor for now.   If any worsening neuro symptoms such as vision changes, numbness, tingling, weakness or if worsening go to the ER.   If persists may need to see neuro. She will let me know.   - Vitamin B12 [E]  - Folic Acid Serum (Folate)    3. Back pain  Likely musculoskeletal.   She has an orthopedist she sees for her back. She will make an appt with them if the pain persists over the next few days.         Return if symptoms worsen or fail to improve.      Susan Garcia MD

## 2024-07-11 NOTE — TELEPHONE ENCOUNTER
Patient called and was in today (7/11/24) and stated that after appointment she did feel some urinary discomfort. Pt was hoping to get an Rx for UTI pain.   Please send to [facility]/pharmacy #[id number] - [place], [place] - [address] [phone], [phone]

## 2024-07-11 NOTE — TELEPHONE ENCOUNTER
Aware. Verbalizes understanding. Patient had labs and ua done w/[facility]. Patient concerned as [facility] stated results could not be done until Monday. Patient doesn't want to wait whole weekend for treatment. Thanks!

## 2024-07-11 NOTE — TELEPHONE ENCOUNTER
I need to wait for at least the ua before I can determine if she has a uti. Usually the ua comes back pretty quickly so should be back by tomorrow I would think.

## 2024-07-12 LAB
ABSOLUTE BASOPHILS: 29 CELLS/UL (ref 0–200)
ABSOLUTE EOSINOPHILS: 29 CELLS/UL (ref 15–500)
ABSOLUTE LYMPHOCYTES: 1015 CELLS/UL (ref 850–3900)
ABSOLUTE MONOCYTES: 336 CELLS/UL (ref 200–950)
ABSOLUTE NEUTROPHILS: 4292 CELLS/UL (ref 1500–7800)
ALBUMIN/GLOBULIN RATIO: 1.3 (CALC) (ref 1–2.5)
ALBUMIN: 4.2 G/DL (ref 3.6–5.1)
ALKALINE PHOSPHATASE: 56 U/L (ref 31–125)
ALT: 9 U/L (ref 6–29)
APPEARANCE: CLEAR
AST: 14 U/L (ref 10–30)
BASOPHILS: 0.5 %
BILIRUBIN, TOTAL: 0.7 MG/DL (ref 0.2–1.2)
BILIRUBIN: NEGATIVE
BUN: 14 MG/DL (ref 7–25)
CALCIUM: 9.7 MG/DL (ref 8.6–10.2)
CARBON DIOXIDE: 28 MMOL/L (ref 20–32)
CHLORIDE: 104 MMOL/L (ref 98–110)
COLOR: YELLOW
CREATININE: 0.68 MG/DL (ref 0.5–0.97)
EGFR: 114 ML/MIN/1.73M2
EOSINOPHILS: 0.5 %
FOLATE, SERUM: 14.5 NG/ML
GLOBULIN: 3.3 G/DL (CALC) (ref 1.9–3.7)
GLUCOSE: 90 MG/DL (ref 65–139)
GLUCOSE: NEGATIVE
HEMATOCRIT: 42.7 % (ref 35–45)
HEMOGLOBIN: 14.4 G/DL (ref 11.7–15.5)
KETONES: NEGATIVE
LEUKOCYTE ESTERASE: NEGATIVE
LIPASE: 30 U/L (ref 7–60)
LYMPHOCYTES: 17.8 %
MCH: 31.9 PG (ref 27–33)
MCHC: 33.7 G/DL (ref 32–36)
MCV: 94.7 FL (ref 80–100)
MONOCYTES: 5.9 %
MPV: 11.4 FL (ref 7.5–12.5)
NEUTROPHILS: 75.3 %
NITRITE: NEGATIVE
PH: 5.5 (ref 5–8)
PLATELET COUNT: 236 THOUSAND/UL (ref 140–400)
POTASSIUM: 4.3 MMOL/L (ref 3.5–5.3)
PROTEIN, TOTAL: 7.5 G/DL (ref 6.1–8.1)
PROTEIN: NEGATIVE
RDW: 11.7 % (ref 11–15)
RED BLOOD CELL COUNT: 4.51 MILLION/UL (ref 3.8–5.1)
SODIUM: 140 MMOL/L (ref 135–146)
SPECIFIC GRAVITY: 1.04 (ref 1–1.03)
VITAMIN B12: 428 PG/ML (ref 200–1100)
WHITE BLOOD CELL COUNT: 5.7 THOUSAND/UL (ref 3.8–10.8)

## 2024-07-12 NOTE — TELEPHONE ENCOUNTER
Aware. Verbalizes understanding. Patient called her gyne. They can't get her in for two weeks for pelvic exam and patient doesn't want to wait that long. Advised her to reach out to them to express concerns she is telling me as they are the ones evaluating/treating to see if they can see her sooner. Also informed can see if they have different provider in group that may be able to see her sooner. Okay to also give first available provider info with gyne lloyd and laura for patient to try to get in sooner elsewhere? Thanks!

## 2024-07-12 NOTE — TELEPHONE ENCOUNTER
Patient calling back stating soonest to get in for testing is two weeks. Advised if symptoms are urgent she should immediately go to er. Aware. Verbalizes understanding. Spoke to provider. Mcm sent to patient as okay when previously spoke to patient.

## 2024-07-12 NOTE — TELEPHONE ENCOUNTER
Aware. Verbalizes understanding. Scheduled patient for appoinment on Monday in our office.    Future Appointments   Date Time Provider Department Center   7/15/2024 10:20 AM Susan Garcia MD [facility] [address] [place]   7/26/2024  7:30 PM PF [facility]   8/29/2024  8:40 AM Otoniel Corrigan DO [facility]

## 2024-07-12 NOTE — TELEPHONE ENCOUNTER
Yes that's fine. Urgent care is an option as well if symptoms worsen over the weekend.   She can make an appt here if she likes and I can do a pelvic exam for her but she will still also need follow up with gyne if the exam is negative. I have an appt on Monday if she can come at that time.   Her urine test shows no signs of infection. Her labs show no worrisome findings. There is trace blood in the urine which may be nonspecific but given her pain and her anxiety about the pain we can do a ct abdomen pelvis kidney stone protocol to rule out kidney stones. Please order.

## 2024-07-12 NOTE — TELEPHONE ENCOUNTER
I cannot comment on if this may be due to her iud. She should reach out to her gyne to address her questions about pelvic inflammatory disease. I would recommend a visit with her gyne for a pelvic exam if needed.

## 2024-07-15 ENCOUNTER — OFFICE VISIT (OUTPATIENT)
Dept: INTERNAL MEDICINE CLINIC | Facility: CLINIC | Age: 39
End: 2024-07-15
Payer: COMMERCIAL

## 2024-07-15 VITALS
HEART RATE: 84 BPM | DIASTOLIC BLOOD PRESSURE: 72 MMHG | SYSTOLIC BLOOD PRESSURE: 110 MMHG | WEIGHT: 144.19 LBS | TEMPERATURE: 98 F | RESPIRATION RATE: 12 BRPM | HEIGHT: 65 IN | BODY MASS INDEX: 24.02 KG/M2

## 2024-07-15 DIAGNOSIS — R10.2 PELVIC PAIN: Primary | ICD-10-CM

## 2024-07-15 DIAGNOSIS — R10.9 ABDOMINAL PAIN, UNSPECIFIED ABDOMINAL LOCATION: ICD-10-CM

## 2024-07-15 PROCEDURE — 81514 NFCT DS BV&VAGINITIS DNA ALG: CPT | Performed by: INTERNAL MEDICINE

## 2024-07-15 PROCEDURE — 87591 N.GONORRHOEAE DNA AMP PROB: CPT | Performed by: INTERNAL MEDICINE

## 2024-07-15 PROCEDURE — 87491 CHLMYD TRACH DNA AMP PROBE: CPT | Performed by: INTERNAL MEDICINE

## 2024-07-15 PROCEDURE — 99213 OFFICE O/P EST LOW 20 MIN: CPT | Performed by: INTERNAL MEDICINE

## 2024-07-15 RX ORDER — OMEPRAZOLE 40 MG/1
40 CAPSULE, DELAYED RELEASE ORAL DAILY
COMMUNITY

## 2024-07-15 NOTE — PROGRESS NOTES
[facility]    CHIEF COMPLAINT:    Chief Complaint   Patient presents with    Follow - Up     11/8/23-pap.          HISTORY OF PRESENT ILLNESS:  here for follow up.   Her abdominal and back pain got worse the day after she last saw me but then got better over the weekend. Overall she is feeling better.     She was able to get in touch with dr. Bullock and she ordered a pelvic us for her.   Noted in her [employer] message about the problems she had with removal of the old iud. She now has a new iud in place.     REVIEW OF SYSTEMS:  See HPI    Current Medications:    Current Outpatient Medications   Medication Sig Dispense Refill    Omeprazole 40 MG Oral Capsule Delayed Release Take 1 capsule (40 mg total) by mouth daily.      Cholecalciferol (VITAMIN D) 50 MCG (2000 UT) Oral Cap Take 1 capsule (2,000 Units total) by mouth daily.      Methylphenidate HCl ER, CD, 10 MG Oral Cap CR Take 1 capsule (10 mg total) by mouth every morning.      ascorbic acid 100 MG Oral Tab Take by mouth daily.      escitalopram 10 MG Oral Tab Take 1 tablet (10 mg total) by mouth daily. (Patient not taking: Reported on 7/15/2024) 90 tablet 3       PAST MEDICAL, SOCIAL, AND FAMILY HISTORY:  Tobacco:    History   Smoking Status    Never   Smokeless Tobacco    Never       PHYSICAL EXAM:  /72 (BP Location: Right arm, Patient Position: Sitting, Cuff Size: adult)   Pulse 84   Temp 97.6 °F (36.4 °C) (Temporal)   Resp 12   Ht 5' 5\" (1.651 m)   Wt 144 lb 3.2 oz (65.4 kg)   LMP  (LMP Unknown)   Breastfeeding No   BMI 24.00 kg/m²    GENERAL: well developed, well nourished,in no apparent distress  GI: some lower abdominal discomfort to palpation. No rebound or gurading, normal bowel sounds, abdomen is soft.   : no vaginal discharge. No cervical motion tenderness. Has mild adnexal discomfort on bimanual exam. No masses palpated.     DATA:  Results for orders placed or performed in visit on 07/11/24   CBC With Differential With Platelet    Result Value Ref Range    WHITE BLOOD CELL COUNT 5.7 3.8 - 10.8 Thousand/uL    RED BLOOD CELL COUNT 4.51 3.80 - 5.10 Million/uL    HEMOGLOBIN 14.4 11.7 - 15.5 g/dL    HEMATOCRIT 42.7 35.0 - 45.0 %    MCV 94.7 80.0 - 100.0 fL    MCH 31.9 27.0 - 33.0 pg    MCHC 33.7 32.0 - 36.0 g/dL    RDW 11.7 11.0 - 15.0 %    PLATELET COUNT 236 140 - 400 Thousand/uL    MPV 11.4 7.5 - 12.5 fL    ABSOLUTE NEUTROPHILS 4,292 1,500 - 7,800 cells/uL    ABSOLUTE LYMPHOCYTES 1,015 850 - 3,900 cells/uL    ABSOLUTE MONOCYTES 336 200 - 950 cells/uL    ABSOLUTE EOSINOPHILS 29 15 - 500 cells/uL    ABSOLUTE BASOPHILS 29 0 - 200 cells/uL    NEUTROPHILS 75.3 %    LYMPHOCYTES 17.8 %    MONOCYTES 5.9 %    EOSINOPHILS 0.5 %    BASOPHILS 0.5 %   Comp Metabolic Panel (14)   Result Value Ref Range    GLUCOSE 90 65 - 139 mg/dL    UREA NITROGEN (BUN) 14 7 - 25 mg/dL    CREATININE 0.68 0.50 - 0.97 mg/dL    EGFR 114 > OR = 60 mL/min/1.73m2    BUN/CREATININE RATIO SEE NOTE: 6 - 22 (calc)    SODIUM 140 135 - 146 mmol/L    POTASSIUM 4.3 3.5 - 5.3 mmol/L    CHLORIDE 104 98 - 110 mmol/L    CARBON DIOXIDE 28 20 - 32 mmol/L    CALCIUM 9.7 8.6 - 10.2 mg/dL    PROTEIN, TOTAL 7.5 6.1 - 8.1 g/dL    ALBUMIN 4.2 3.6 - 5.1 g/dL    GLOBULIN 3.3 1.9 - 3.7 g/dL (calc)    ALBUMIN/GLOBULIN RATIO 1.3 1.0 - 2.5 (calc)    BILIRUBIN, TOTAL 0.7 0.2 - 1.2 mg/dL    ALKALINE PHOSPHATASE 56 31 - 125 U/L    AST 14 10 - 30 U/L    ALT 9 6 - 29 U/L   Lipase   Result Value Ref Range    LIPASE 30 7 - 60 U/L   Urinalysis with Culture Reflex    Specimen: Urine   Result Value Ref Range    COLOR YELLOW YELLOW    APPEARANCE CLEAR CLEAR    SPECIFIC GRAVITY 1.037 (H) 1.001 - 1.035    PH 5.5 5.0 - 8.0    GLUCOSE NEGATIVE NEGATIVE    BILIRUBIN NEGATIVE NEGATIVE    KETONES NEGATIVE NEGATIVE    OCCULT BLOOD TRACE (A) NEGATIVE    PROTEIN NEGATIVE NEGATIVE    NITRITE NEGATIVE NEGATIVE    LEUKOCYTE ESTERASE NEGATIVE NEGATIVE    WBC Urine 0-5 < OR = 5 /HPF    RBC 3-10 (A) < OR = 2 /HPF    SQUAMOUS  EPITHELIAL CELLS 0-5 < OR = 5 /HPF    BACTERIA NONE SEEN NONE SEEN /HPF    HYALINE CAST NONE SEEN NONE SEEN /LPF    NOTE      REFLEXIVE URINE CULTURE     Vitamin B12 [E]   Result Value Ref Range    VITAMIN B12 428 200 - 1,100 pg/mL   Folic Acid Serum (Folate)   Result Value Ref Range    FOLATE, SERUM 14.5 ng/mL            ASSESSMENT AND PLAN:  1. Pelvic pain  Cultures done.   No cervical tenderness.   Do us as ordered by gyne and follow up with gyne for the adnexal tenderness.   - Chlamydia/Gc Amplification [E]  - Vaginitis Vaginosis PCR Panel; Future  - Vaginitis Vaginosis PCR Panel    2. Abdominal pain, unspecified abdominal location  Do ct abdomen pelvis as planned.   Discussed possibility of kidney stones.         Return if symptoms worsen or fail to improve.      Susan Garcia MD

## 2024-07-16 LAB
BV BACTERIA DNA VAG QL NAA+PROBE: NEGATIVE
C GLABRATA DNA VAG QL NAA+PROBE: NEGATIVE
C KRUSEI DNA VAG QL NAA+PROBE: NEGATIVE
C TRACH DNA SPEC QL NAA+PROBE: NEGATIVE
CANDIDA DNA VAG QL NAA+PROBE: NEGATIVE
N GONORRHOEA DNA SPEC QL NAA+PROBE: NEGATIVE
T VAGINALIS DNA VAG QL NAA+PROBE: NEGATIVE

## 2024-11-08 ENCOUNTER — PATIENT MESSAGE (OUTPATIENT)
Dept: INTERNAL MEDICINE CLINIC | Facility: CLINIC | Age: 39
End: 2024-11-08

## 2024-12-23 ENCOUNTER — OFFICE VISIT (OUTPATIENT)
Dept: INTERNAL MEDICINE CLINIC | Facility: CLINIC | Age: 39
End: 2024-12-23
Payer: COMMERCIAL

## 2024-12-23 VITALS
OXYGEN SATURATION: 97 % | TEMPERATURE: 100 F | WEIGHT: 147 LBS | SYSTOLIC BLOOD PRESSURE: 114 MMHG | BODY MASS INDEX: 24.49 KG/M2 | DIASTOLIC BLOOD PRESSURE: 62 MMHG | HEART RATE: 78 BPM | RESPIRATION RATE: 16 BRPM | HEIGHT: 65 IN

## 2024-12-23 DIAGNOSIS — R05.1 ACUTE COUGH: Primary | ICD-10-CM

## 2024-12-23 PROCEDURE — 99214 OFFICE O/P EST MOD 30 MIN: CPT | Performed by: NURSE PRACTITIONER

## 2024-12-23 RX ORDER — ALBUTEROL SULFATE 90 UG/1
2 INHALANT RESPIRATORY (INHALATION) EVERY 6 HOURS PRN
Qty: 1 EACH | Refills: 0 | Status: SHIPPED | OUTPATIENT
Start: 2024-12-23

## 2024-12-23 RX ORDER — BENZONATATE 200 MG/1
200 CAPSULE ORAL 3 TIMES DAILY PRN
Qty: 30 CAPSULE | Refills: 0 | Status: CANCELLED | OUTPATIENT
Start: 2024-12-23

## 2024-12-23 RX ORDER — CODEINE PHOSPHATE AND GUAIFENESIN 10; 100 MG/5ML; MG/5ML
5 SOLUTION ORAL NIGHTLY PRN
Qty: 118 ML | Refills: 0 | Status: SHIPPED | OUTPATIENT
Start: 2024-12-23

## 2024-12-23 RX ORDER — PREDNISONE 20 MG/1
40 TABLET ORAL DAILY
Qty: 10 TABLET | Refills: 0 | Status: SHIPPED | OUTPATIENT
Start: 2024-12-23 | End: 2024-12-28

## 2024-12-23 RX ORDER — DEXTROAMPHETAMINE SACCHARATE, AMPHETAMINE ASPARTATE, DEXTROAMPHETAMINE SULFATE AND AMPHETAMINE SULFATE 2.5; 2.5; 2.5; 2.5 MG/1; MG/1; MG/1; MG/1
10 TABLET ORAL 2 TIMES DAILY
COMMUNITY

## 2024-12-23 NOTE — PROGRESS NOTES
CHIEF COMPLAINT:     Chief Complaint   Patient presents with    Cough     Dry Cough for 2 weeks; denies sinus congestions, fever.        HPI:   Ayleen Hoffmann is a 39 year old female coming in with complaints of dry cough x 2 weeks. Denies any other URI symptoms. Cough is keeping her up at night. Alternating between nyquil and mucinex without much relief. Denies fevers, chills, SOB, CP, loss of taste/smell, nausea, vomiting, or diarrhea.     Past Medical History:    Abdominal pain    Allergic rhinitis    Anxiety    Bloating    Diarrhea, unspecified    Disorder of thyroid    Around age 19 - no current treatment; previous treatment with methimazole, inderal, and PHILLIP    Eczema    Hx of motion sickness    Hyperthyroidism    S/P cholecystectomy    Thyroid disease    Visual impairment    no contacts/glasses      Past Surgical History:   Procedure Laterality Date      10/03/2014      2016    Colonoscopy      Laparoscopic cholecystectomy  2014    Procedure: LAPAROSCOPIC CHOLECYSTECTOMY;  Surgeon: Cy Brian MD;  Location:  MAIN OR    Other surgical history      eye surgery as a baby. Left eye      Social History:  Social History     Socioeconomic History    Marital status:     Number of children: 2   Tobacco Use    Smoking status: Never    Smokeless tobacco: Never   Vaping Use    Vaping status: Never Used   Substance and Sexual Activity    Alcohol use: Yes     Alcohol/week: 2.0 standard drinks of alcohol     Types: 2 Glasses of wine per week     Comment: 1 drink per week     Drug use: No    Sexual activity: Yes     Partners: Male   Other Topics Concern    Caffeine Concern Yes    Stress Concern No    Weight Concern Yes    Exercise Yes     Comment: Tennis    Seat Belt Yes   Social History Narrative    ** Merged History Encounter **          Social Drivers of Health      Received [facility], [facility]    Housing Stability       Family History:  Family History   Problem Relation Age of Onset    Cancer Maternal Grandmother 81        lung cancer    Other (Other) Maternal Grandfather         brain tumor    Cancer Mother     Other (Other) Mother         Fibroids      Allergies:  Allergies[1]   Current Meds:  Current Outpatient Medications   Medication Sig Dispense Refill    amphetamine-dextroamphetamine 10 MG Oral Tab Take 1 tablet (10 mg total) by mouth 2 (two) times daily.      sertraline 50 MG Oral Tab Take 1 tablet (50 mg total) by mouth daily.      predniSONE 20 MG Oral Tab Take 2 tablets (40 mg total) by mouth daily for 5 days. 10 tablet 0    albuterol 108 (90 Base) MCG/ACT Inhalation Aero Soln Inhale 2 puffs into the lungs every 6 (six) hours as needed. 1 each 0    guaiFENesin-codeine 100-10 MG/5ML Oral Solution Take 5 mL by mouth nightly as needed for cough. 118 mL 0    Cholecalciferol (VITAMIN D) 50 MCG (2000 UT) Oral Cap Take 1 capsule (2,000 Units total) by mouth daily.      ascorbic acid 100 MG Oral Tab Take by mouth daily.         Counseling given: Not Answered       REVIEW OF SYSTEMS:   See HPI.    EXAM:     /62 (BP Location: Left arm, Patient Position: Sitting, Cuff Size: adult)   Pulse 78   Temp 99.6 °F (37.6 °C) (Temporal)   Resp 16   Ht 5' 5\" (1.651 m)   Wt 147 lb (66.7 kg)   LMP  (LMP Unknown)   SpO2 97%   BMI 24.46 kg/m²   Body mass index is 24.46 kg/m².   Vital signs reviewed. Appears stated age, well groomed, in no acute distress.  Physical Exam:  GENERAL: Patient is alert, awake and oriented, well developed, well nourished.  HEENT: Head: Normocephalic, atraumatic. Eyes: EOMI, PERRLA, conjunctivae clear bilaterally, no eye discharge. Ears: External normal, TM clear bilaterally. No maxillary or sinus tenderness with palpation.  NECK: Supple, no CLAD  HEART: RRR without murmur.  LUNGS: Clear to auscultation bilaterally, no rales/rhonchi/wheezing.  ABDOMEN: good BS's, no masses, HSM or  tenderness  MUSCULOSKELETAL: No obvious joint deformity or swelling.   EXTREMITIES: No edema, no cyanosis, no clubbing, FROM  NEURO: Oriented time three.     LABS:      Lab Results   Component Value Date    WBC 5.7 07/11/2024    RBC 4.51 07/11/2024    HGB 14.4 07/11/2024    HCT 42.7 07/11/2024    MCV 94.7 07/11/2024    MCH 31.9 07/11/2024    MCHC 33.7 07/11/2024    RDW 11.7 07/11/2024     07/11/2024    MPV 11.8 (H) 01/08/2012      Lab Results   Component Value Date    GLU 90 07/11/2024    BUN 14 07/11/2024    BUNCREA SEE NOTE: 07/11/2024    CREATSERUM 0.68 07/11/2024    ANIONGAP 7 01/05/2022    GFRNAA 112 01/05/2022    GFRAA 129 01/05/2022    CA 9.7 07/11/2024    OSMOCALC 289 01/05/2022    ALKPHO 56 07/11/2024    AST 14 07/11/2024    ALT 9 07/11/2024    BILT 0.7 07/11/2024    TP 7.5 07/11/2024    ALB 4.2 07/11/2024    GLOBULIN 3.3 07/11/2024    AGRATIO 1.3 07/11/2024     07/11/2024    K 4.3 07/11/2024     07/11/2024    CO2 28 07/11/2024      Lab Results   Component Value Date    CHOLEST 163 01/05/2022    TRIG 117 01/05/2022    HDL 69 (H) 01/05/2022    LDL 73 01/05/2022    VLDL 18 01/05/2022    NONHDLC 94 01/05/2022      Lab Results   Component Value Date    TSH 1.050 01/05/2022      Lab Results   Component Value Date    EAG 94 01/05/2022    A1C 4.9 01/05/2022        IMAGING:     No results found.     ASSESSMENT AND PLAN:   1. Acute cough  -Do CXR to r/o pneumonia  -Start course of prednisone, SE discussed  -Elijah SANCHEZ has worked for her in the past. Take 5 ml at bedtime prn, cautioned regarding drowsiness  -Start albuterol inhaler prn  -Supportive care discussed  -Follow up if symptoms persist/worsen  - XR CHEST PA + LAT CHEST (CPT=71046); Future  - predniSONE 20 MG Oral Tab; Take 2 tablets (40 mg total) by mouth daily for 5 days.  Dispense: 10 tablet; Refill: 0  - albuterol 108 (90 Base) MCG/ACT Inhalation Aero Soln; Inhale 2 puffs into the lungs every 6 (six) hours as needed.  Dispense: 1  each; Refill: 0  - guaiFENesin-codeine 100-10 MG/5ML Oral Solution; Take 5 mL by mouth nightly as needed for cough.  Dispense: 118 mL; Refill: 0     The patient indicates understanding of these issues and agrees to the plan.  Return if symptoms worsen or fail to improve.    Adelinamoises Riley, APRN  12/23/2024         [1]   Allergies  Allergen Reactions    Dust Mite Extract OTHER (SEE COMMENTS)    Other OTHER (SEE COMMENTS)     Environmental - Trees, pollens, grass    Pollen HIVES    Penicillins OTHER (SEE COMMENTS)     Pt is unsure of reaction-had allergy testing    Dog Epithelium ITCHING

## 2025-01-04 ENCOUNTER — HOSPITAL ENCOUNTER (OUTPATIENT)
Dept: GENERAL RADIOLOGY | Age: 40
Discharge: HOME | End: 2025-01-04
Attending: NURSE PRACTITIONER
Payer: COMMERCIAL

## 2025-01-04 DIAGNOSIS — R05.1 ACUTE COUGH: ICD-10-CM

## 2025-01-04 PROCEDURE — 71046 X-RAY EXAM CHEST 2 VIEWS: CPT | Performed by: NURSE PRACTITIONER

## 2025-01-06 ENCOUNTER — OFFICE VISIT (OUTPATIENT)
Dept: INTERNAL MEDICINE CLINIC | Facility: CLINIC | Age: 40
End: 2025-01-06
Payer: COMMERCIAL

## 2025-01-06 VITALS
HEART RATE: 82 BPM | SYSTOLIC BLOOD PRESSURE: 112 MMHG | RESPIRATION RATE: 18 BRPM | BODY MASS INDEX: 23.22 KG/M2 | WEIGHT: 139.38 LBS | TEMPERATURE: 99 F | DIASTOLIC BLOOD PRESSURE: 72 MMHG | HEIGHT: 65 IN | OXYGEN SATURATION: 99 %

## 2025-01-06 DIAGNOSIS — J01.90 ACUTE BACTERIAL SINUSITIS: Primary | ICD-10-CM

## 2025-01-06 DIAGNOSIS — B96.89 ACUTE BACTERIAL SINUSITIS: Primary | ICD-10-CM

## 2025-01-06 PROCEDURE — 99213 OFFICE O/P EST LOW 20 MIN: CPT | Performed by: STUDENT IN AN ORGANIZED HEALTH CARE EDUCATION/TRAINING PROGRAM

## 2025-01-06 RX ORDER — DOXYCYCLINE HYCLATE 100 MG
100 TABLET ORAL 2 TIMES DAILY
Qty: 20 TABLET | Refills: 0 | Status: SHIPPED | OUTPATIENT
Start: 2025-01-06 | End: 2025-01-16

## 2025-01-06 NOTE — PROGRESS NOTES
Chief Complaint   Patient presents with    Viral Syndrome     Patient has had sinus symptoms since 12/23. Patient states there is PND, sinus pressure around nose and eyes, ear pressure, headaches. Cough is better and minimal congestion. Patient took pred, albuterol, and cough syrup with little to no relief. OTC mucinex and ibuprofen- little to no relief. At home covid test last week = negative.     SUBJECTIVE & A/P:  The patient is a 39 year old female with pmhx of depression, Grave's disease, ADHD, seasonal allergies and eczema who presents with persistent cough and sinus symptoms.    //Acute bacterial sinusitis.  Seen on 12/23 for acute cough. Symptoms ongoing since, but progressive. Given course of prednisone, cheratussin, albuterol PRN. No improvement with steroids. CXR was done today and it was negative. Now having eye pressure, ear pressure, headaches, dizziness. Sometimes will note pressure release in the ear but comes back. Denies fevers, shortness of breath, chest pain. Did note wheezing a couple days after [place]. Occasionally lightheaded and dizziness from over exertion. 2 in home COVID negative. Over the counter doing mucinex, afrin for only 3 days, but has stopped.   PLAN:   Penicillin allergy. Prescribed doxycycline x 10 days. XR reassuring without evidence of pneumonia.   -Flonase 1 puff each nostril twice a day. Nasal saline spray prior.     Return in about 6 months (around 7/6/2025) for Physical Exam .    Earnestine Price MD    Past Medical History:   Past Medical History:    Abdominal pain    Allergic rhinitis    Anxiety    Bloating    Diarrhea, unspecified    Disorder of thyroid    Around age 19 - no current treatment; previous treatment with methimazole, inderal, and PHILLIP    Eczema    Hx of motion sickness    Hyperthyroidism    S/P cholecystectomy    Thyroid disease    Visual impairment    no contacts/glasses        Past Surgical History:   Past Surgical History:   Procedure Laterality Date       10/03/2014      2016    Colonoscopy      Laparoscopic cholecystectomy  2014    Procedure: LAPAROSCOPIC CHOLECYSTECTOMY;  Surgeon: Cy Brian MD;  Location:  [facility] OR    Other surgical history      eye surgery as a baby. Left eye       Current Medications:    Current Outpatient Medications   Medication Sig Dispense Refill    Doxycycline Hyclate 100 MG Oral Tab Take 1 tablet (100 mg total) by mouth 2 (two) times daily for 10 days. 20 tablet 0    amphetamine-dextroamphetamine 10 MG Oral Tab Take 1 tablet (10 mg total) by mouth 2 (two) times daily.      sertraline 50 MG Oral Tab Take 1 tablet (50 mg total) by mouth daily.      albuterol 108 (90 Base) MCG/ACT Inhalation Aero Soln Inhale 2 puffs into the lungs every 6 (six) hours as needed. 1 each 0    Cholecalciferol (VITAMIN D) 50 MCG (2000 UT) Oral Cap Take 1 capsule (2,000 Units total) by mouth daily.      ascorbic acid 100 MG Oral Tab Take by mouth daily.         PHYSICAL EXAM:   /72 (BP Location: Right arm, Patient Position: Sitting, Cuff Size: adult)   Pulse 82   Temp 98.7 °F (37.1 °C) (Temporal)   Resp 18   Ht 5' 5\" (1.651 m)   Wt 139 lb 6.4 oz (63.2 kg)   LMP  (LMP Unknown)   SpO2 99%   BMI 23.20 kg/m²  Body mass index is 23.2 kg/m².   General: No acute distress. Alert and oriented x 3.  HEENT: Ear canals clear, but TM bulging. TMs pearly gray bilaterally. Throat with erythema and cobblestoning. No exudates.   Neck: No lymphadenopathy.  No thyromegaly, but do note nodule in L thyroid lobe.   Respiratory: Clear to auscultation bilaterally.  No wheezes, rales, or rhonchi.   Cardiovascular: RRR. No murmurs, rubs, or gallops.   Psychiatric: Appropriate mood and affect.    LABS:   Lab Results   Component Value Date    A1C 4.9 2022      Lab Results   Component Value Date    GLU 90 2024    BUN 14 2024    BUNCREA SEE NOTE: 2024    CREATSERUM 0.68 2024    ANIONGAP 7 2022    GFRNAA  112 01/05/2022    GFRAA 129 01/05/2022    CA 9.7 07/11/2024    OSMOCALC 289 01/05/2022    ALKPHO 56 07/11/2024    AST 14 07/11/2024    ALT 9 07/11/2024    BILT 0.7 07/11/2024    TP 7.5 07/11/2024    ALB 4.2 07/11/2024    GLOBULIN 3.3 07/11/2024    AGRATIO 1.3 07/11/2024     07/11/2024    K 4.3 07/11/2024     07/11/2024    CO2 28 07/11/2024      Lab Results   Component Value Date    WBC 5.7 07/11/2024    RBC 4.51 07/11/2024    HGB 14.4 07/11/2024    HCT 42.7 07/11/2024    MCV 94.7 07/11/2024    MCH 31.9 07/11/2024    MCHC 33.7 07/11/2024    RDW 11.7 07/11/2024     07/11/2024    MPV 11.8 (H) 01/08/2012        IMAGING: Reviewed XR.

## 2025-01-06 NOTE — PATIENT INSTRUCTIONS
Flonase 1 puff each nostril twice a day. Nasal saline spray prior to flonase.   Doxycycline twice a day x 10 days ordered.

## 2025-01-19 DIAGNOSIS — R05.1 ACUTE COUGH: ICD-10-CM

## 2025-01-20 NOTE — TELEPHONE ENCOUNTER
Can we reach out to Ayleen to see how she is feeling? She should have completed abx by now and should not have any more symptoms.

## 2025-01-20 NOTE — TELEPHONE ENCOUNTER
Last OV relevant to medication: 1/6/25  Last refill date: 12/23/24 #1/refills: 0  When pt was asked to return for OV:7/6/25  Upcoming appt/reason: No future appointments.  Was pt informed of any over due labs: hugo

## 2025-01-21 RX ORDER — ALBUTEROL SULFATE 90 UG/1
2 INHALANT RESPIRATORY (INHALATION) EVERY 6 HOURS PRN
Qty: 6.7 EACH | Refills: 0 | OUTPATIENT
Start: 2025-01-21

## 2025-01-21 NOTE — TELEPHONE ENCOUNTER
Spoke to patient. Auto request from pharmacy- does not need. Patient doing good- reports no symptoms. She has little bit of abx left as she forgot to take some, advised her to finish what she has left of abx. Aware if anything changes to let us know. Rx denied. Not taking albuterol currently- did not help. Abx really has helped her. Fyi-thanks!

## 2025-05-08 ENCOUNTER — HOSPITAL ENCOUNTER (OUTPATIENT)
Age: 40
Discharge: HOME | End: 2025-05-08
Attending: INTERNAL MEDICINE
Payer: COMMERCIAL

## 2025-05-08 VITALS
HEIGHT: 65 IN | DIASTOLIC BLOOD PRESSURE: 97 MMHG | WEIGHT: 140 LBS | SYSTOLIC BLOOD PRESSURE: 128 MMHG | RESPIRATION RATE: 18 BRPM | HEART RATE: 80 BPM | TEMPERATURE: 99 F | BODY MASS INDEX: 23.32 KG/M2 | OXYGEN SATURATION: 98 %

## 2025-05-08 DIAGNOSIS — S61.214A LACERATION OF RIGHT RING FINGER WITHOUT FOREIGN BODY WITHOUT DAMAGE TO NAIL, INITIAL ENCOUNTER: ICD-10-CM

## 2025-05-08 DIAGNOSIS — Z23 NEED FOR TDAP VACCINATION: ICD-10-CM

## 2025-05-08 DIAGNOSIS — S61.216A LACERATION OF RIGHT LITTLE FINGER WITHOUT DAMAGE TO NAIL, FOREIGN BODY PRESENCE UNSPECIFIED, INITIAL ENCOUNTER: Primary | ICD-10-CM

## 2025-05-08 PROCEDURE — 99203 OFFICE O/P NEW LOW 30 MIN: CPT | Performed by: PHYSICIAN ASSISTANT

## 2025-05-08 PROCEDURE — 90471 IMMUNIZATION ADMIN: CPT | Performed by: PHYSICIAN ASSISTANT

## 2025-05-08 PROCEDURE — 12042 INTMD RPR N-HF/GENIT2.6-7.5: CPT | Performed by: PHYSICIAN ASSISTANT

## 2025-05-08 PROCEDURE — 90715 TDAP VACCINE 7 YRS/> IM: CPT | Performed by: PHYSICIAN ASSISTANT

## 2025-05-08 NOTE — ED PROVIDER NOTES
Patient Seen in: [facility]      History     Chief Complaint   Patient presents with    Laceration/Abrasion     Stated Complaint: RT PINKY CUT/POSS STICHES    Subjective:   HPI    Patient is a 39-year-old female who presents to immediate care due to right hand laceration that she sustained prior to arrival.  Patient states that she was attempting to cut a English muffin and sliced her 4th and 5th digit of her right hand.  Last Tdap unknown.  Notes minimal pain.  Denies limited range of motion, paresthesias.  History of Present Illness               Objective:     Past Medical History:    Abdominal pain    Allergic rhinitis    Anxiety    Bloating    Diarrhea, unspecified    Disorder of thyroid    Around age 19 - no current treatment; previous treatment with methimazole, inderal, and PHILLIP    Eczema    Hx of motion sickness    Hyperthyroidism    S/P cholecystectomy    Thyroid disease    Visual impairment    no contacts/glasses              Past Surgical History:   Procedure Laterality Date      10/03/2014      2016    Colonoscopy      Laparoscopic cholecystectomy  2014    Procedure: LAPAROSCOPIC CHOLECYSTECTOMY;  Surgeon: Cy Brian MD;  Location:  MAIN OR    Other surgical history      eye surgery as a baby. Left eye                Social History     Socioeconomic History    Marital status:     Number of children: 2   Tobacco Use    Smoking status: Never    Smokeless tobacco: Never   Vaping Use    Vaping status: Never Used   Substance and Sexual Activity    Alcohol use: Yes     Alcohol/week: 2.0 standard drinks of alcohol     Types: 2 Glasses of wine per week     Comment: 1 drink per week     Drug use: No    Sexual activity: Yes     Partners: Male   Other Topics Concern    Caffeine Concern Yes    Stress Concern No    Weight Concern Yes    Exercise Yes     Comment: Tennis    Seat Belt Yes   Social History Narrative    ** Merged History Encounter **           Social Drivers of Health     Food Insecurity: No Food Insecurity (3/19/2025)    Received from [facility]    Food Insecurity     Currently or in the past 3 months, have you worried your food would run out before you had money to buy more?: No     In the past 12 months, have you run out of food or been unable to get more?: No   Transportation Needs: No Transportation Needs (3/19/2025)    Received from [facility]    Transportation Needs     Currently or in the past 3 months, has lack of transportation kept you from medical appointments, getting food or medicine, or providing care to a family member?: No    Received from [facility]    Housing Stability              Review of Systems    Positive for stated complaint: RT PINKY CUT/POSS STICHES  Other systems are as noted in HPI.  Constitutional and vital signs reviewed.      All other systems reviewed and negative except as noted above.                  Physical Exam     ED Triage Vitals [05/08/25 0831]   BP (!) 128/97   Pulse 80   Resp 18   Temp 98.7 °F (37.1 °C)   Temp src Oral   SpO2 98 %   O2 Device None (Room air)       Current Vitals:   Vital Signs  BP: (!) 128/97  Pulse: 80  Resp: 18  Temp: 98.7 °F (37.1 °C)  Temp src: Oral    Oxygen Therapy  SpO2: 98 %  O2 Device: None (Room air)        Physical Exam  Vital signs reviewed. Nursing note reviewed.  Constitutional: Well-developed. Well-nourished. In no acute distress  HENT: Mucous membranes moist.   EYES: No scleral icterus or conjunctival injection.  NECK: Full ROM. Supple.   CARDIAC: Normal rate. Normal S1/ S2.  PULM/CHEST:  No wheezes  Extremities: Full ROM of right hand and fingers.  2 cm linear well-approximated laceration over the distal fifth digit of the right hand along with 1 cm linear well-approximated laceration over the distal aspect of the right ring finger.  No active bleeding.  No foreign body.  No nailbed damage.  NEURO: Awake, alert,  following commands, moving extremities, answering questions.   SKIN: Warm and dry. No rash or lesions.  PSYCH: Normal judgment. Normal affect.      Physical Exam                               MDM      Patient is a 39-year-old female that presents to immediate care due to multiple hand lacerations that she sustained prior to arrival.  Patient arrives with stable vitals.  Physical exam showing laceration to her 4th and 5th digit of her right hand, palmar aspect.  Unlikely underlying fracture or dislocation as patient has full range of motion strength of right hand.  Less likely retained foreign body.  Will repair lacerations prior to discharge.  Will update Tdap prior to discharge.  History given by patient.    PROCEDURE-right 5th digit  laceration:  Wound explored to its base with a gloved finger.  There were no deep structures involved. No tendon injury was identified.  Laceration irrigated with copious amount of normal saline.  Laceration length 2 cm.  Local anesthesia obtained with 1 cc of 1% Lidocaine without epinephrine.  2, 4-0 Nylon sutures placed.  Skin well-approximated.  Bleeding controlled.  Laceration repair was of moderate complexity.  Sterile dressing applied.  Patient tolerated procedure well without complication.   PROCEDURE-right 4th digit  laceration:  Wound explored to its base with a gloved finger.  There were no deep structures involved. No tendon injury was identified.  Laceration irrigated with copious amount of normal saline.  Laceration length 1 cm.  Local anesthesia obtained with 1 cc of 1% Lidocaine without epinephrine.  3, 4-0 Nylon sutures placed.  Skin well-approximated.  Bleeding controlled.  Laceration repair was of moderate complexity.  Sterile dressing applied.  Patient tolerated procedure well without complication.       Medical Decision Making      Disposition and Plan     Clinical Impression:  1. Laceration of right little finger without damage to nail, foreign body presence  unspecified, initial encounter    2. Laceration of right ring finger without foreign body without damage to nail, initial encounter    3. Need for Tdap vaccination         Disposition:  Discharge  5/8/2025  8:57 am    Follow-up:  [facility] [address] [address]  [phone]  In 10 days  For suture removal          Medications Prescribed:  Current Discharge Medication List          Supplementary Documentation:

## 2025-05-19 ENCOUNTER — HOSPITAL ENCOUNTER (OUTPATIENT)
Age: 40
Discharge: HOME | End: 2025-05-19
Payer: COMMERCIAL

## 2025-05-19 VITALS
RESPIRATION RATE: 16 BRPM | HEART RATE: 71 BPM | SYSTOLIC BLOOD PRESSURE: 122 MMHG | OXYGEN SATURATION: 100 % | TEMPERATURE: 98 F | DIASTOLIC BLOOD PRESSURE: 75 MMHG

## 2025-05-19 DIAGNOSIS — Z48.02 ENCOUNTER FOR REMOVAL OF SUTURES: Primary | ICD-10-CM

## 2025-05-19 PROCEDURE — 99024 POSTOP FOLLOW-UP VISIT: CPT | Performed by: PHYSICIAN ASSISTANT

## 2025-05-19 NOTE — DISCHARGE INSTRUCTIONS
Gently clean wound daily with soap and water  Keep wound covered when outside of home     Watch for signs of infection including redness, swelling, worsening pain, pus/drainage, fever. If you experience these or any other concerning symptoms, go to nearest ER immediately.

## 2025-05-19 NOTE — ED PROVIDER NOTES
Chief Complaint   Patient presents with    Sut Stap RingRemoval       History obtained from: patient   services not used    HPI:     Ayleen Hoffmann is a 39 year old female who presents for suture removal. Patient was seen in  on 5/8 for laceration to right 4th and 5th fingers sustained while cutting an English muffin. Patient had 3 sutures placed to 4th digit and 2 sutures placed to 5th digit. Patient states all 3 digits to her 4th finger came out on their own. Patient reports wound to 5th digit is \"sensitive\" but otherwise denies any complaints or issues. Denies wound separation, bleeding, drainage, bleeding, redness.     PMH  Past Medical History[1]    PFSH    PFS asessment screens reviewed and agree.  Nurses notes reviewed I agree with documentation.    Family History[2]  Family history reviewed with patient/caregiver and is not pertinent to presenting problem.  Social History     Socioeconomic History    Marital status:      Spouse name: Not on file    Number of children: 2    Years of education: Not on file    Highest education level: Not on file   Occupational History    Not on file   Tobacco Use    Smoking status: Never    Smokeless tobacco: Never   Vaping Use    Vaping status: Never Used   Substance and Sexual Activity    Alcohol use: Yes     Alcohol/week: 2.0 standard drinks of alcohol     Types: 2 Glasses of wine per week     Comment: 1 drink per week     Drug use: No    Sexual activity: Yes     Partners: Male   Other Topics Concern     Service Not Asked    Blood Transfusions Not Asked    Caffeine Concern Yes    Occupational Exposure Not Asked    Hobby Hazards Not Asked    Sleep Concern Not Asked    Stress Concern No    Weight Concern Yes    Special Diet Not Asked    Back Care Not Asked    Exercise Yes     Comment: Tennis    Bike Helmet Not Asked    Seat Belt Yes    Self-Exams Not Asked   Social History Narrative    ** Merged History Encounter **          Social Drivers of  Health     Food Insecurity: No Food Insecurity (3/19/2025)    Received from [facility]    Food Insecurity     Currently or in the past 3 months, have you worried your food would run out before you had money to buy more?: No     In the past 12 months, have you run out of food or been unable to get more?: No   Transportation Needs: No Transportation Needs (3/19/2025)    Received from [facility]    Transportation Needs     Currently or in the past 3 months, has lack of transportation kept you from medical appointments, getting food or medicine, or providing care to a family member?: No     Non-Medical Transportation Needs?: Not on file   Housing Stability: Low Risk  (1/31/2022)    Received from [facility]    Housing Stability     Mortgage Payment Concerns?: Not on file     Number of Places Lived in the Last Year: Not on file     Unstable Housing?: Not on file         ROS:   Positive for stated complaint: suture removal   Other systems are as noted in HPI.   All other systems reviewed and negative except as noted above.    Physical Exam:   Vital signs and nursing note reviewed.       /75   Pulse 71   Temp 98.2 °F (36.8 °C) (Oral)   Resp 16   LMP  (LMP Unknown)   SpO2 100%     GENERAL: well developed, no acute distress, non-toxic appearing   SKIN: good skin turgor, no obvious rashes  HEAD: normocephalic, atraumatic  EYES: sclera non-icteric bilaterally, conjunctiva clear bilaterally  OROPHARYNX: MMM, maintaining airway and secretions  NECK: no nuchal rigidity, no trismus, no edema, phonation normal    CARDIO: regular rate, radial pulse 2+ bilaterally, cap refill < 2 sec   LUNGS: no increased WOB  EXTREMITIES: 2 cm linear laceration to distal pad of right 5th finger with 2 sutures loosely intact, well-approximated 1 cm linear wound to pad of right 4th finger with no sutures, no dehiscence of wounds, no bleeding or drainage from wounds, no surrounding  or streaking erythema, no bony tenderness, no swelling, FROM, compartments soft, CMS intact   NEURO: no focal deficits  PSYCH: alert and oriented x3, answering questions appropriately, mood appropriate    MDM/Assessment/Plan:   Orders for this encounter:    No orders of the defined types were placed in this encounter.      Labs performed this visit:  No results found for this or any previous visit (from the past 10 hours).    Imaging performed this visit:  No orders to display       Medical Decision Making  DDx includes suture removal versus wound check versus other.  Patient is overall well-appearing.  Wounds are well-approximated with no signs of infection.  Sutures to fourth digit have spontaneously removed.  2 remaining sutures to fifth digit removed.  No sutures remaining on exam.  Bacitracin and gauze wrap applied to fifth digit.  Discussed wound care.  Instructed patient to go directly to nearest ER with any worsening or concerning symptoms.  Follow-up with PCP.          Diagnosis:    ICD-10-CM    1. Encounter for removal of sutures  Z48.02           All results reviewed and discussed with patient/patient's family. Patient/patient's family verbalize excellent understanding of instructions and feels comfortable with plan. All of patient's/patient's family's questions were addressed.   See AVS for detailed discharge instructions for your condition today.    Follow Up with:  Susan Garcia MD  [address] [facility]  [address] [place] [id number] [phone]            Note: This document was dictated using Dragon medical dictation software.  Proofreading was performed to the best of my ability, but errors may be present.    Ayana Novak PA-C       [1]   Past Medical History:   Abdominal pain    Allergic rhinitis    Anxiety    Bloating    Diarrhea, unspecified    Disorder of thyroid    Around age 19 - no current treatment; previous treatment with methimazole, inderal, and PHILLIP    Eczema    Hx of motion  sickness    Hyperthyroidism    S/P cholecystectomy    Thyroid disease    Visual impairment    no contacts/glasses   [2]   Family History  Problem Relation Age of Onset    Cancer Maternal Grandmother 81        lung cancer    Other (Other) Maternal Grandfather         brain tumor    Cancer Mother     Other (Other) Mother         Fibroids

## 2025-12-09 ENCOUNTER — APPOINTMENT (OUTPATIENT)
Dept: ENDOCRINOLOGY | Age: 40
End: 2025-12-09

## (undated) DEVICE — SOLUTION IRRIG 3000ML 0.9% NACL FLX CONT

## (undated) DEVICE — PACK GYNE CUSTOM

## (undated) DEVICE — PREMIUM WET SKIN PREP TRAY: Brand: MEDLINE INDUSTRIES, INC.

## (undated) DEVICE — SET CYSTO IRRIG L77IN DIA0.241IN BLDR NVENT

## (undated) DEVICE — GLOVE SUR 6 SENSICARE PI PIP CRM PWD F

## (undated) DEVICE — SLEEVE COMPR MD KNEE LEN SGL USE KENDALL SCD

## (undated) NOTE — LETTER
07/13/21    Cookie Serrano 49      Dear Cherri Brown,  Just a friendly reminder, our records indicate that you have outstanding lab work that was ordered for you and expected prior to your annual physical.       CBC Wi

## (undated) NOTE — LETTER
CLARIFICATION FOR E-SSS    To: Dr. Bullock     Patient Name: Ayleen Hoffmann-Age / Sex: 1985-A: 38 y  female   Medical Records: [id number] [facility]: [id number]      Procedure Description:  HYSTEROSCOPY REMOVAL OF MIRENA INTRAUTERINE DEVICE WITH MIRENA INTRAUTERINE DEVICE REPLACEMENT    Please note that clarification is needed on the Electronic-Surgery Scheduling Sheet (E-SSS)  the original is included with this letter. Please have physician review and make changes on the faxed copy of the E-SSS.      Please review and submit change if needed    Other: 1) Patient's first name spelled incorrectly 2) Please check \"Initiate MY Pre-op standing orders\" (Under Pre Admission Testing Section)    ALL CHANGES MUST BE DOCUMENTED ON THE E-SSS AND SIGNED BY THE PHYSICIAN    After physician has made the changes and signed the E-SSS please fax it to [phone] and these changes will then be made in Epic by the OR schedulers.     If you have any questions please call Pre-Admission Testing at [phone]    Thank you

## (undated) NOTE — ED AVS SNAPSHOT
Lima Pierre   MRN: [id number]    Department:  [facility] Emergency Department   Date of Visit:  12/23/2019           Disclosure     Insurance plans vary and the physician(s) referred by the ER may not be covered by your plan.  Please contact tell this physician (or your personal doctor if your instructions are to return to your personal doctor) about any new or lasting problems. The primary care or specialist physician will see patients referred from the [facility] Emergency Department.  Vinnie Kothari

## (undated) NOTE — MR AVS SNAPSHOT
[address] [address] [phone]               Thank you for choosing us for your health care visit with KAREN Du.   We are glad to serve you and happy to provide you wi CBC W Differential W Platelet [E]    Complete by:  As directed    Assoc Dx:  Bilateral numbness and tingling of arms and legs [R20.2], Fatigue, unspecified type [R53.83]           Vitamin D, 25-Hydroxy [E]    Complete by:  As directed    Assoc Dx:  Molly Cameron information, go to [email or web address] and click on the Sign Up Now link in the [employer] box. Enter your [employer] Activation Code exactly as it appears below along with your Zip Code and Date of Birth to complete the sign-up process.  If you do

## (undated) NOTE — ED AVS SNAPSHOT
Lexi Pettit   MRN: [id number]    Department:  [facility] Emergency Department   Date of Visit:  2/17/2018           Disclosure     Insurance plans vary and the physician(s) referred by the ER may not be covered by your plan.  Please contact tell this physician (or your personal doctor if your instructions are to return to your personal doctor) about any new or lasting problems. The primary care or specialist physician will see patients referred from the [facility] Emergency Department.  Bonny Suarez

## (undated) NOTE — LETTER
1/13/2020    Personal and Confidential  Via Certified Mail    Toma Serrano 49    Dear Ms. Hoffmann,     At [place] [address], patient care is our priority.   To best serve our patients, our mission is to be forward to being a part of your health care team.    Sincerely,        Jonathan Olson MD  [place] [address]  [address] [facility]  Dept: [phone]  Dept Fax: [phone]

## (undated) NOTE — LETTER
04/07/21        Lima Serrano 49      Dear Kaitlin Bryson,    This is a friendly reminder to let you know you have outstanding lab work as listed below.   To complete your labs, please contact your provider of choice and

## (undated) NOTE — Clinical Note
Dear Marysol Jacobson,  Thank you for referring your patient to [facility]. We value our relationship with you and appreciate your confidence in our service and staff.    It is with great pleasure that we were able to provide treatment to Castro Ann

## (undated) NOTE — Clinical Note
05/30/2017      Paramjit Serrano 49  7/20/1985      Dear [facility],        [facility] records indicate that you have not completed the following medical service(s) per your physician's request.      Medical Service(s) to be co

## (undated) NOTE — LETTER
05/12/21    Valencia Serrano 49    Dear Diogo Miranda,    This is a friendly reminder to let you know you are due for a follow up appointment and you have outstanding lab work as listed below.   To schedule your follow up ap

## (undated) NOTE — LETTER
12/27/19        Cookie Serrano 49      Dear Cherri Brown,    [address] [facility] records indicate that you have outstanding lab work and or testing that was ordered for you and has not yet been completed:        CBC With Diff      CMP

## (undated) NOTE — MR AVS SNAPSHOT
[facility], [address], [address] [place] [id number] [phone]               Thank you for choosing us for your health care visit with Author DO Sarah.   We are glad to serve you and happy to provide you with [facility] family member will be picking up prescription, office must be given name of individual in advance and they must present an ID as well. ? The name of the person picking up your prescription must be documented in your chart.   Scheduling Tests    If your phy 114/76 mmHg 84 66\" 170 lb 27.45 kg/m2 Unknown         Current Medications          This list is accurate as of: 2/20/17 11:18 AM.  Always use your most recent med list.                ergocalciferol 27611 units Caps   Take 1 capsule (50,000 Units total) To schedule an appointment for your radiology test please call [facility] Sched[facility]   at [phone]. Watchsend     Sign up for Watchsend, your secure online medical record.   Watchsend will allow you to access patient instructions from you